# Patient Record
Sex: FEMALE | Race: WHITE | NOT HISPANIC OR LATINO | URBAN - METROPOLITAN AREA
[De-identification: names, ages, dates, MRNs, and addresses within clinical notes are randomized per-mention and may not be internally consistent; named-entity substitution may affect disease eponyms.]

---

## 2020-08-06 NOTE — PROGRESS NOTES
Problem List:  1. Tachycardia  a. Echocardiogram 4/26/18: EF 55-60%, mild MR, RVSP 34 mmHg  b. Holter Monitor 9/30/19: HR  bpm. NSR with average HR 88 bpm. No bradycardia or pauses. No ventricular ectopy. Episode of NSAT  2. HTN  3. HLP  4. DARRYL - CPAP   5. Hiatal Hernia  6. Anxiety  7. Surgical History:  a. Bilateral tubal ligation  b. Cholecystectomy  c. Lumbar spine surgery

## 2020-08-07 ENCOUNTER — CONSULT (OUTPATIENT)
Dept: CARDIOLOGY | Facility: CLINIC | Age: 45
End: 2020-08-07

## 2020-08-07 VITALS
HEIGHT: 63 IN | WEIGHT: 231 LBS | HEART RATE: 83 BPM | BODY MASS INDEX: 40.93 KG/M2 | DIASTOLIC BLOOD PRESSURE: 70 MMHG | SYSTOLIC BLOOD PRESSURE: 128 MMHG

## 2020-08-07 DIAGNOSIS — I47.1 ATRIAL TACHYCARDIA (HCC): Primary | ICD-10-CM

## 2020-08-07 PROCEDURE — 99244 OFF/OP CNSLTJ NEW/EST MOD 40: CPT | Performed by: INTERNAL MEDICINE

## 2020-08-07 RX ORDER — ONDANSETRON 4 MG/1
4 TABLET, FILM COATED ORAL EVERY 8 HOURS PRN
COMMUNITY
End: 2021-06-08

## 2020-08-07 RX ORDER — FLECAINIDE ACETATE 100 MG/1
100 TABLET ORAL 2 TIMES DAILY
Qty: 60 TABLET | Refills: 11 | Status: SHIPPED | OUTPATIENT
Start: 2020-08-07 | End: 2021-06-08

## 2020-08-07 RX ORDER — SUMATRIPTAN 100 MG/1
100 TABLET, FILM COATED ORAL AS NEEDED
COMMUNITY

## 2020-08-07 RX ORDER — ACYCLOVIR 800 MG/1
800 TABLET ORAL 3 TIMES DAILY
COMMUNITY
End: 2021-06-08

## 2020-08-07 RX ORDER — CITALOPRAM 10 MG/1
10 TABLET ORAL DAILY
COMMUNITY
End: 2021-06-08

## 2020-08-07 RX ORDER — METOPROLOL TARTRATE 50 MG/1
50 TABLET, FILM COATED ORAL 2 TIMES DAILY
Qty: 60 TABLET | Refills: 5
Start: 2020-08-07 | End: 2021-06-08

## 2020-08-07 RX ORDER — POTASSIUM CHLORIDE 750 MG/1
10 TABLET, EXTENDED RELEASE ORAL 2 TIMES DAILY
COMMUNITY
End: 2021-06-08

## 2020-08-07 RX ORDER — AMITRIPTYLINE HYDROCHLORIDE 25 MG/1
25 TABLET, FILM COATED ORAL NIGHTLY
COMMUNITY
End: 2021-06-08

## 2020-08-07 RX ORDER — METOPROLOL TARTRATE 100 MG/1
100 TABLET ORAL 2 TIMES DAILY
COMMUNITY
End: 2020-08-07 | Stop reason: SDUPTHER

## 2020-08-07 RX ORDER — FUROSEMIDE 40 MG/1
40 TABLET ORAL DAILY
COMMUNITY
End: 2021-06-08

## 2020-08-07 RX ORDER — ATORVASTATIN CALCIUM 40 MG/1
40 TABLET, FILM COATED ORAL DAILY
COMMUNITY
End: 2021-06-08

## 2020-08-07 RX ORDER — ESOMEPRAZOLE MAGNESIUM 40 MG/1
40 CAPSULE, DELAYED RELEASE ORAL
COMMUNITY

## 2020-08-07 RX ORDER — VENLAFAXINE 75 MG/1
75 TABLET ORAL DAILY
COMMUNITY
End: 2021-06-08

## 2020-08-07 RX ORDER — DICYCLOMINE HCL 20 MG
20 TABLET ORAL EVERY 6 HOURS
COMMUNITY
End: 2021-06-08

## 2020-08-07 RX ORDER — NAPROXEN 500 MG/1
500 TABLET ORAL 2 TIMES DAILY WITH MEALS
COMMUNITY
End: 2021-06-08

## 2020-08-07 RX ORDER — TRAZODONE HYDROCHLORIDE 50 MG/1
50 TABLET ORAL NIGHTLY PRN
COMMUNITY

## 2020-08-07 RX ORDER — DOXYCYCLINE HYCLATE 50 MG/1
50 CAPSULE ORAL DAILY
COMMUNITY
End: 2021-10-14

## 2020-08-07 NOTE — PROGRESS NOTES
[facility]  INITIAL OFFICE CONSULT      Eliza Capps  1975  PCP: Herman Iyer MD    SUBJECTIVE:   Eliza Capps is a 44 y.o. female seen for a consultation visit regarding the following:     Chief Complaint: SVT       Consultation is requested by May Oliveira MD for evaluation of SVT      History:  Pleasant 44-year-old female presents today at request of Dr. Oliveira regarding palpitations.  Patient reports she is had palpitations for over 2 years.  She states intermittently she will have random fast episode of tachypalpitations lasting a few seconds resolving on their own.  She also feels that her heart rate is elevated most the time.  She initially has been treated with metoprolol therapy last year she increased from 50 to 100 mg twice daily.  She continues to have breakthrough events.  She has not any chest pain suggesting angina pectoris.  She did have a routine stress test 2 years ago that revealed no evidence of ischemia.  She had recent follow-up monitor in January that revealed documentation of the symptoms that been bothering her.  This revealed episodes of atrial tachycardia.  In addition at that time she had echocardiogram revealing normal LV function.  She denies any dizziness, near syncope's events.  She does have some lower extreme edema which has been treated with Lasix therapy by Dr. Oliveira and has improved this.  She wears a CPAP on a regular basis.      Cardiac PMH: (Old records have been reviewed and summarized below)  Problem List:  1. Atrial Tachycardia  a. Echocardiogram 4/26/18: EF 55-60%, mild MR, RVSP 34 mmHg  b. Holter Monitor 9/30/19: HR  bpm. NSR with average HR 88 bpm. No bradycardia or pauses. No ventricular ectopy. Episode of NSAT  c. Negative MPS 2018  2. HTN  3. HLP  4. DARRYL - CPAP   5. Hiatal Hernia  6. Anxiety  7. Obesity, BMI 40.9  8. Dm type II  9. Migraine Headaches  10. Surgical History:  a. Bilateral tubal  ligation  b. Cholecystectomy  c. Lumbar spine surgery       Past Medical History, Past Surgical History, Family history, Social History, and Medications were all reviewed with the patient today and updated as necessary.     Current Outpatient Medications   Medication Sig Dispense Refill   • acyclovir (ZOVIRAX) 800 MG tablet Take 800 mg by mouth 3 (Three) Times a Day.     • amitriptyline (ELAVIL) 25 MG tablet Take 25 mg by mouth Every Night.     • atorvastatin (LIPITOR) 40 MG tablet Take 40 mg by mouth Daily.     • citalopram (CeleXA) 10 MG tablet Take 10 mg by mouth Daily.     • Cyanocobalamin (B-12 IJ) Inject  as directed Every 30 (Thirty) Days.     • dicyclomine (BENTYL) 20 MG tablet Take 20 mg by mouth Every 6 (Six) Hours.     • doxycycline (VIBRAMYCIN) 50 MG capsule Take 50 mg by mouth Daily.     • esomeprazole (nexIUM) 40 MG capsule Take 40 mg by mouth Every Morning Before Breakfast.     • furosemide (LASIX) 40 MG tablet Take 40 mg by mouth Daily.     • metFORMIN (GLUCOPHAGE) 500 MG tablet Take 500 mg by mouth 2 (Two) Times a Day With Meals.     • metoprolol tartrate (LOPRESSOR) 100 MG tablet Take 100 mg by mouth 2 (Two) Times a Day.     • naproxen (NAPROSYN) 500 MG tablet Take 500 mg by mouth 2 (Two) Times a Day With Meals.     • ondansetron (ZOFRAN) 4 MG tablet Take 4 mg by mouth Every 8 (Eight) Hours As Needed for Nausea or Vomiting.     • Plecanatide (Trulance) 3 MG tablet Take  by mouth Daily.     • potassium chloride (K-DUR,KLOR-CON) 10 MEQ CR tablet Take 10 mEq by mouth 2 (Two) Times a Day.     • SUMAtriptan (IMITREX) 100 MG tablet Take 100 mg by mouth As Needed for Migraine. Take one tablet at onset of headache. May repeat dose one time in 2 hours if headache not relieved.     • traZODone (DESYREL) 50 MG tablet Take 50 mg by mouth At Night As Needed for Sleep.     • venlafaxine (EFFEXOR) 75 MG tablet Take 75 mg by mouth Daily. 3 caps qd       No current facility-administered medications for this visit.   "    No Known Allergies      Past Medical History:   Diagnosis Date   • Clotting disorder (CMS/HCC)    • Diabetes mellitus (CMS/HCC)     pre-diabetic   • Hyperlipidemia    • Hypertension    • Sleep apnea      Past Surgical History:   Procedure Laterality Date   • BACK SURGERY     • CHOLECYSTECTOMY     • TUBAL ABDOMINAL LIGATION       History reviewed. No pertinent family history.  Social History     Tobacco Use   • Smoking status: Never Smoker   • Smokeless tobacco: Never Used   Substance Use Topics   • Alcohol use: Not Currently     Frequency: Never       ROS:  Review of Symptoms:  General: + fatigue  Skin: no rashes, lumps, or other skin changes  HEENT: no dizziness, lightheadedness, or vision changes  Respiratory: no cough or hemoptysis  Cardiovascular: + palpitations, and tachycardia  Gastrointestinal: no black/tarry stools or diarrhea  Urinary: no change in frequency or urgency  Peripheral Vascular: no claudication or leg cramps  Musculoskeletal: no muscle or joint pain/stiffness  Psychiatric: no depression or excessive stress  Neurological: no sensory or motor loss, no syncope  Hematologic: no anemia, easy bruising or bleeding  Endocrine: no thyroid problems, nor heat or cold intolerance         PHYSICAL EXAM:   /70 (BP Location: Left arm, Patient Position: Sitting)   Pulse 83   Ht 160 cm (63\")   Wt 105 kg (231 lb)   BMI 40.92 kg/m²      Wt Readings from Last 5 Encounters:   08/07/20 105 kg (231 lb)     BP Readings from Last 5 Encounters:   08/07/20 128/70       Physical Exam   Constitutional: oriented to person, place, and time.  well-developed and well-nourished. No distress.   HENT: Normocephalic.   Eyes: Conjunctivae are normal. No scleral icterus.   Neck: Normal carotid pulses, no hepatojugular reflux and no JVD present. Carotid bruit is not present. No tracheal deviation, no edema and no erythema present. No thyromegaly present.   Cardiovascular: Regular rate and rhythm normal S1 and S2 there is " an S4 no murmurs. PMI NL  Pulses: equal and symmetrical.   Pulmonary/Chest: Clear to Auscultation bilaterally with no rales or wheezes. Resp effort NL  Abdominal: Soft. Bowel sounds are normal. no distension and no mass. There is no hepatosplenomegaly. There is no tenderness. There is no rebound and no guarding. No aortic pulsations.  Musculoskeletal:  exhibits no edema, tenderness or deformity.   Neurological: is alert and oriented to person, place, and time.  Rest is nonfocal.    Skin: Skin is warm and dry. No rash noted. No diaphoretic. No cyanosis or erythema. No pallor. Nails show no clubbing.   Psychiatric: Normal mood and affect.Speech is normal and behavior is normal.      Medical problems and test results were reviewed with the patient today.     EKG:  (EKG/Tracing has been independently visualized by me and summarized below)    Procedures    ASSESSMENT   1. SVT: AT on Zio monitor  2. Normal Stress MPS 2018, Normal Echocardiogram, Normal EF.   3. Obesity: Needs diet exercise, weight loss. BMI 40.9  4. DARRYL-CPAP  5. Dm Type II      PLAN  · Tambocor 100mg BID, EKG 72 hours later with results to our office  · Reduce lopressor to 50mg BID secandary to fatigue on higher dose medication.  · Follow up in [facility].     Cardiology/Electrophysiology  08/07/20  11:15  Will Yoandy CARBALLO Scribe for Dr. Hong ALEXIS, Mango Pitts MD, personally performed the services described in this documentation as scribed by the above named individual in my presence, and it is both accurate and complete.  8/12/2020  17:51

## 2021-06-08 ENCOUNTER — OFFICE VISIT (OUTPATIENT)
Dept: ORTHOPEDIC SURGERY | Facility: CLINIC | Age: 46
End: 2021-06-08

## 2021-06-08 VITALS
WEIGHT: 197.4 LBS | BODY MASS INDEX: 34.98 KG/M2 | HEIGHT: 63 IN | HEART RATE: 56 BPM | DIASTOLIC BLOOD PRESSURE: 92 MMHG | SYSTOLIC BLOOD PRESSURE: 151 MMHG

## 2021-06-08 DIAGNOSIS — M25.551 RIGHT HIP PAIN: ICD-10-CM

## 2021-06-08 DIAGNOSIS — M70.61 TROCHANTERIC BURSITIS OF RIGHT HIP: Primary | ICD-10-CM

## 2021-06-08 PROCEDURE — 20610 DRAIN/INJ JOINT/BURSA W/O US: CPT | Performed by: ORTHOPAEDIC SURGERY

## 2021-06-08 PROCEDURE — 99204 OFFICE O/P NEW MOD 45 MIN: CPT | Performed by: ORTHOPAEDIC SURGERY

## 2021-06-08 RX ORDER — LIDOCAINE HYDROCHLORIDE 10 MG/ML
3 INJECTION, SOLUTION EPIDURAL; INFILTRATION; INTRACAUDAL; PERINEURAL
Status: COMPLETED | OUTPATIENT
Start: 2021-06-08 | End: 2021-06-08

## 2021-06-08 RX ORDER — BUPIVACAINE HYDROCHLORIDE 2.5 MG/ML
3 INJECTION, SOLUTION EPIDURAL; INFILTRATION; INTRACAUDAL
Status: COMPLETED | OUTPATIENT
Start: 2021-06-08 | End: 2021-06-08

## 2021-06-08 RX ORDER — ATENOLOL 50 MG/1
50 TABLET ORAL 2 TIMES DAILY
COMMUNITY

## 2021-06-08 RX ORDER — LUBIPROSTONE 24 UG/1
24 CAPSULE ORAL 2 TIMES DAILY WITH MEALS
COMMUNITY
End: 2021-12-18

## 2021-06-08 RX ORDER — TRIAMCINOLONE ACETONIDE 40 MG/ML
80 INJECTION, SUSPENSION INTRA-ARTICULAR; INTRAMUSCULAR
Status: COMPLETED | OUTPATIENT
Start: 2021-06-08 | End: 2021-06-08

## 2021-06-08 RX ADMIN — LIDOCAINE HYDROCHLORIDE 3 ML: 10 INJECTION, SOLUTION EPIDURAL; INFILTRATION; INTRACAUDAL; PERINEURAL at 10:38

## 2021-06-08 RX ADMIN — TRIAMCINOLONE ACETONIDE 80 MG: 40 INJECTION, SUSPENSION INTRA-ARTICULAR; INTRAMUSCULAR at 10:38

## 2021-06-08 RX ADMIN — BUPIVACAINE HYDROCHLORIDE 3 ML: 2.5 INJECTION, SOLUTION EPIDURAL; INFILTRATION; INTRACAUDAL at 10:38

## 2021-06-08 NOTE — PROGRESS NOTES
Orthopaedic Clinic Note: Hip New Patient    Chief Complaint   Patient presents with   • Right Hip - Pain        HPI    Eliza Capps is a 45 y.o. female who presents with right hip pain for 4 week(s). Onset mechanical fall. Pain is localized to lateral trochanter and is a 10/10 on the pain scale.Pain is described as aching, burning, throbbing, stabbing and shooting. Associated symptoms include pain, grinding, stiffness and giving way/buckling.  The pain is worse with walking, standing, sitting, climbing stairs, sleeping, working, leisure, lying of affected side, rising from a seated position, any movement of joint; lying down and elevating the extremity improve the pain. Previous treatments have included: weight loss since symptom onset. Although some transient relief was reported with these interventions, these conservative measures have failed and symptoms have persisted. The patient is limited in daily activities and has had a significant decrease in quality of life as a result. She denies fevers, chills, or constitutional symptoms.    I have reviewed the following portions of the patient's history:History of Present Illness    Past Medical History:   Diagnosis Date   • Clotting disorder (CMS/HCC)    • Diabetes mellitus (CMS/[facility])     pre-diabetic   • Hyperlipidemia    • Hypertension    • Sleep apnea       Past Surgical History:   Procedure Laterality Date   • BACK SURGERY     • CHOLECYSTECTOMY     • GASTRIC SLEEVE LAPAROSCOPIC  04/2021   • TUBAL ABDOMINAL LIGATION        History reviewed. No pertinent family history.  Social History     Socioeconomic History   • Marital status:      Spouse name: Not on file   • Number of children: Not on file   • Years of education: Not on file   • Highest education level: Not on file   Tobacco Use   • Smoking status: Never Smoker   • Smokeless tobacco: Never Used   Substance and Sexual Activity   • Alcohol use: Not Currently   • Drug use: Never   • Sexual activity:  Defer      Current Outpatient Medications on File Prior to Visit   Medication Sig Dispense Refill   • atenolol (TENORMIN) 50 MG tablet Take 50 mg by mouth 2 (Two) Times a Day.     • Cyanocobalamin (B-12 IJ) Inject  as directed Every 30 (Thirty) Days.     • doxycycline (VIBRAMYCIN) 50 MG capsule Take 50 mg by mouth Daily.     • esomeprazole (nexIUM) 40 MG capsule Take 40 mg by mouth Every Morning Before Breakfast.     • HYDROCODONE-ACETAMINOPHEN PO Take 7.5-325 mg/mL by mouth. 15 mL TID     • lubiprostone (AMITIZA) 24 MCG capsule Take 24 mcg by mouth 2 (Two) Times a Day With Meals.     • sertraline (ZOLOFT) 50 MG tablet Take 50 mg by mouth Daily.     • SUMAtriptan (IMITREX) 100 MG tablet Take 100 mg by mouth As Needed for Migraine. Take one tablet at onset of headache. May repeat dose one time in 2 hours if headache not relieved.     • traZODone (DESYREL) 50 MG tablet Take 50 mg by mouth At Night As Needed for Sleep.     • [DISCONTINUED] acyclovir (ZOVIRAX) 800 MG tablet Take 800 mg by mouth 3 (Three) Times a Day.     • [DISCONTINUED] amitriptyline (ELAVIL) 25 MG tablet Take 25 mg by mouth Every Night.     • [DISCONTINUED] atorvastatin (LIPITOR) 40 MG tablet Take 40 mg by mouth Daily.     • [DISCONTINUED] citalopram (CeleXA) 10 MG tablet Take 10 mg by mouth Daily.     • [DISCONTINUED] dicyclomine (BENTYL) 20 MG tablet Take 20 mg by mouth Every 6 (Six) Hours.     • [DISCONTINUED] flecainide (TAMBOCOR) 100 MG tablet Take 1 tablet by mouth 2 (Two) Times a Day. 60 tablet 11   • [DISCONTINUED] furosemide (LASIX) 40 MG tablet Take 40 mg by mouth Daily.     • [DISCONTINUED] metFORMIN (GLUCOPHAGE) 500 MG tablet Take 500 mg by mouth 2 (Two) Times a Day With Meals.     • [DISCONTINUED] metoprolol tartrate (LOPRESSOR) 50 MG tablet Take 1 tablet by mouth 2 (Two) Times a Day. 60 tablet 5   • [DISCONTINUED] naproxen (NAPROSYN) 500 MG tablet Take 500 mg by mouth 2 (Two) Times a Day With Meals.     • [DISCONTINUED] ondansetron  "(ZOFRAN) 4 MG tablet Take 4 mg by mouth Every 8 (Eight) Hours As Needed for Nausea or Vomiting.     • [DISCONTINUED] Plecanatide (Trulance) 3 MG tablet Take  by mouth Daily.     • [DISCONTINUED] potassium chloride (K-DUR,KLOR-CON) 10 MEQ CR tablet Take 10 mEq by mouth 2 (Two) Times a Day.     • [DISCONTINUED] venlafaxine (EFFEXOR) 75 MG tablet Take 75 mg by mouth Daily. 3 caps qd       No current facility-administered medications on file prior to visit.      No Known Allergies     Review of Systems   Constitutional: Negative.    HENT: Negative.    Eyes: Negative.    Respiratory: Negative.    Cardiovascular: Negative.    Gastrointestinal: Negative.    Endocrine: Negative.    Genitourinary: Negative.    Musculoskeletal: Positive for arthralgias.   Skin: Negative.    Allergic/Immunologic: Negative.    Neurological: Negative.    Hematological: Negative.    Psychiatric/Behavioral: Negative.         The patient's Review of Systems was personally reviewed and confirmed as accurate.    The following portions of the patient's history were reviewed and updated as appropriate: allergies, current medications, past family history, past medical history, past social history, past surgical history and problem list.    Physical Exam  Blood pressure 151/92, pulse 56, height 160 cm (62.99\"), weight 89.5 kg (197 lb 6.4 oz).    Body mass index is 34.98 kg/m².    GENERAL APPEARANCE: awake, alert & oriented x 3, in no acute distress and well developed, well nourished  PSYCH: normal affect  LUNGS:  breathing nonlabored  EYES: sclera anicteric  CARDIOVASCULAR: palpable dorsalis pedis, palpable posterior tibial bilaterally. Capillary refill less than 2 seconds  EXTREMITIES: no clubbing, cyanosis  GAIT:  Antalgic           Right Hip Exam:  RANGE OF MOTION:   FLEXION CONTRACTURE: None   FLEXION: 110 degrees   INTERNAL ROTATION: 20 degrees at 90 degrees of flexion   EXTERNAL ROTATION: 40 degrees at 90 degrees of flexion    PAIN WITH HIP MOTION: " Yes, lateral trochanter  PAIN WITH LOGROLL: no  STINCHFIELD TEST: negative    KNEE EXAM: full knee ROM (0-120 degrees), stable to varus and valgus stress at terminal extension and 30 degrees flexion     STRENGTH:  5/5 hip adduction, abduction, flexion. 5/5 strength knee flexion, extension. 5/5 strength ankle dorsiflexion and plantarflexion.     GREATER TROCHANTER BURSAL PAIN: Yes     REFLEXES:   PATELLAR 2+/4   ACHILLES 2+/4    CLONUS: negative  STRAIGHT LEG TEST:   negative    SENSATION TO LIGHT TOUCH:  DEEP PERONEAL/SUPERFICIAL PERONEAL/SURAL/SAPHENOUS/TIBIAL:  intact    EDEMA:   no  ERYTHEMA:  no  WOUNDS/INCISIONS: no overlying skin problems.      Left Hip Exam:   RANGE OF MOTION:   FLEXION CONTRACTURE: None   FLEXION: 110 degrees   INTERNAL ROTATION: 20 degrees at 90 degrees of flexion   EXTERNAL ROTATION: 40 degrees at 90 degrees of flexion    PAIN WITH HIP MOTION: no  PAIN WITH LOGROLL: no  STINCHFIELD TEST: negative    KNEE EXAM: full knee ROM (0-120 degrees), stable to varus and valgus stress at terminal extension and 30 degrees flexion     STRENGTH:  5/5 hip adduction, abduction, flexion. 5/5 strength knee flexion, extension. 5/5 strength ankle dorsiflexion and plantarflexion.     GREATER TROCHANTER BURSAL PAIN:  no     REFLEXES:   PATELLAR 2+/4   ACHILLES 2+/4    CLONUS: negative  STRAIGHT LEG TEST:   negative    SENSATION TO LIGHT TOUCH:  DEEP PERONEAL/SUPERFICIAL PERONEAL/SURAL/SAPHENOUS/TIBIAL:  intact    EDEMA:   no  ERYTHEMA:  no  WOUNDS/INCISIONS: no overlying skin problems.      ------------------------------------------------------------------    LEG LENGTHS:  equal  _____________________________________________________  _____________________________________________________    RADIOGRAPHIC FINDINGS:   Indication: Right hip pain    Comparison: No prior xrays are available for comparison    AP pelvis, hip 2 views: Right: mild joint space narrowing, minimal osteophyte formation; Left: mild joint space  narrowing, minimal osteophyte formation    Assessment/Plan:   Diagnosis Plan   1. Trochanteric bursitis of right hip  Ambulatory Referral to Physical Therapy Evaluate and treat    Large Joint Arthrocentesis: R greater trochanteric bursa   2. Right hip pain  XR Hip With or Without Pelvis 2 - 3 View Right     Patient's hip pain is due to trochanteric bursitis.  She also has pain posteriorly in the SI joint region but this is minor.  I discussed that her hip range of motion is well-preserved and minimally symptomatic today.  I discussed treatment options with her.  She is unable take anti-inflammatory secondary to recent gastric bypass.  I will prescribe her physical therapy as well as proceed to trochanteric bursa cortisone injection today.  She'll follow-up in 2 months for repeat assessment.  If her symptoms fail to adequately improve, MRI may be warranted.    Procedure Note:  I discussed with the patient the potential benefits of performing a therapeutic injection of the right hip trochanteric bursa as well as potential risks including but not limited to infection, swelling, pain, bleeding, bruising, nerve/vessel damage, skin color changes, transient elevation in blood glucose levels, and fat atrophy. After informed consent and verifying correct patient, procedure site, and type of procedure, the area was prepped with alcohol, ethyl chloride was used to numb the skin. Via the direct lateral approach, 3cc of 1% lidocaine, 3cc of 0.25% bupivicaine and 2 cc of 40mg/ml of Kenalog were injected into the right hip trochanteric bursa. The patient tolerated the procedure well. There were no complications. A sterile dressing was placed over the injection site.      Clayton Freed MD  06/08/21  10:39 EDT

## 2021-06-08 NOTE — PROGRESS NOTES
Procedure   Large Joint Arthrocentesis: R greater trochanteric bursa  Date/Time: 6/8/2021 10:38 AM  Consent given by: patient  Site marked: site marked  Timeout: Immediately prior to procedure a time out was called to verify the correct patient, procedure, equipment, support staff and site/side marked as required   Supporting Documentation  Indications: pain   Procedure Details  Location: hip - R greater trochanteric bursa  Preparation: Patient was prepped and draped in the usual sterile fashion  Needle size: 23 G  Approach: lateral  Medications administered: 3 mL bupivacaine (PF) 0.25 %; 3 mL lidocaine PF 1% 1 %; 80 mg triamcinolone acetonide 40 MG/ML  Patient tolerance: patient tolerated the procedure well with no immediate complications

## 2021-08-12 ENCOUNTER — TELEPHONE (OUTPATIENT)
Dept: ORTHOPEDIC SURGERY | Facility: CLINIC | Age: 46
End: 2021-08-12

## 2021-10-14 ENCOUNTER — OFFICE VISIT (OUTPATIENT)
Dept: ENDOCRINOLOGY | Facility: CLINIC | Age: 46
End: 2021-10-14

## 2021-10-14 ENCOUNTER — LAB (OUTPATIENT)
Dept: LAB | Facility: HOSPITAL | Age: 46
End: 2021-10-14

## 2021-10-14 VITALS
DIASTOLIC BLOOD PRESSURE: 82 MMHG | WEIGHT: 186.6 LBS | BODY MASS INDEX: 33.06 KG/M2 | SYSTOLIC BLOOD PRESSURE: 124 MMHG | HEIGHT: 63 IN

## 2021-10-14 DIAGNOSIS — R23.2 PLETHORA: ICD-10-CM

## 2021-10-14 DIAGNOSIS — H05.20 PROPTOSIS: ICD-10-CM

## 2021-10-14 DIAGNOSIS — E11.9 TYPE 2 DIABETES MELLITUS WITHOUT COMPLICATION, WITHOUT LONG-TERM CURRENT USE OF INSULIN (HCC): ICD-10-CM

## 2021-10-14 DIAGNOSIS — R79.89 ABNORMAL THYROID BLOOD TEST: Primary | ICD-10-CM

## 2021-10-14 PROBLEM — Z86.79: Status: ACTIVE | Noted: 2021-10-14

## 2021-10-14 PROBLEM — M54.50 CHRONIC RIGHT-SIDED LOW BACK PAIN: Status: ACTIVE | Noted: 2021-09-13

## 2021-10-14 PROBLEM — G89.29 CHRONIC RIGHT-SIDED LOW BACK PAIN: Status: ACTIVE | Noted: 2021-09-13

## 2021-10-14 PROBLEM — G47.33 OSA (OBSTRUCTIVE SLEEP APNEA): Status: ACTIVE | Noted: 2021-10-14

## 2021-10-14 PROBLEM — I10 HIGH BLOOD PRESSURE: Status: ACTIVE | Noted: 2021-10-14

## 2021-10-14 PROBLEM — R73.03 PREDIABETES: Status: ACTIVE | Noted: 2021-10-14

## 2021-10-14 PROBLEM — K21.9 GASTROESOPHAGEAL REFLUX DISEASE: Status: ACTIVE | Noted: 2021-10-14

## 2021-10-14 LAB
ALBUMIN SERPL-MCNC: 4 G/DL (ref 3.5–5.2)
ALBUMIN/GLOB SERPL: 1.7 G/DL
ALP SERPL-CCNC: 289 U/L (ref 39–117)
ALT SERPL W P-5'-P-CCNC: 19 U/L (ref 1–33)
ANION GAP SERPL CALCULATED.3IONS-SCNC: 10.3 MMOL/L (ref 5–15)
AST SERPL-CCNC: 16 U/L (ref 1–32)
BILIRUB SERPL-MCNC: <0.2 MG/DL (ref 0–1.2)
BUN SERPL-MCNC: 10 MG/DL (ref 6–20)
BUN/CREAT SERPL: 10.8 (ref 7–25)
CALCIUM SPEC-SCNC: 8.9 MG/DL (ref 8.6–10.5)
CHLORIDE SERPL-SCNC: 104 MMOL/L (ref 98–107)
CO2 SERPL-SCNC: 25.7 MMOL/L (ref 22–29)
CORTIS AM PEAK SERPL-MCNC: 14.02 MCG/DL
CREAT SERPL-MCNC: 0.93 MG/DL (ref 0.57–1)
GFR SERPL CREATININE-BSD FRML MDRD: 65 ML/MIN/1.73
GLOBULIN UR ELPH-MCNC: 2.3 GM/DL
GLUCOSE SERPL-MCNC: 137 MG/DL (ref 65–99)
HBA1C MFR BLD: 5.9 % (ref 4.8–5.6)
POTASSIUM SERPL-SCNC: 4.1 MMOL/L (ref 3.5–5.2)
PROT SERPL-MCNC: 6.3 G/DL (ref 6–8.5)
SODIUM SERPL-SCNC: 140 MMOL/L (ref 136–145)
T4 FREE SERPL-MCNC: 0.75 NG/DL (ref 0.93–1.7)
TSH SERPL DL<=0.05 MIU/L-ACNC: 1 UIU/ML (ref 0.27–4.2)

## 2021-10-14 PROCEDURE — 84443 ASSAY THYROID STIM HORMONE: CPT | Performed by: INTERNAL MEDICINE

## 2021-10-14 PROCEDURE — 84439 ASSAY OF FREE THYROXINE: CPT | Performed by: INTERNAL MEDICINE

## 2021-10-14 PROCEDURE — 80053 COMPREHEN METABOLIC PANEL: CPT | Performed by: INTERNAL MEDICINE

## 2021-10-14 PROCEDURE — 83520 IMMUNOASSAY QUANT NOS NONAB: CPT | Performed by: INTERNAL MEDICINE

## 2021-10-14 PROCEDURE — 99204 OFFICE O/P NEW MOD 45 MIN: CPT | Performed by: INTERNAL MEDICINE

## 2021-10-14 PROCEDURE — 83036 HEMOGLOBIN GLYCOSYLATED A1C: CPT | Performed by: INTERNAL MEDICINE

## 2021-10-14 PROCEDURE — 82533 TOTAL CORTISOL: CPT

## 2021-10-14 RX ORDER — CYCLOBENZAPRINE HCL 5 MG
5 TABLET ORAL 3 TIMES DAILY
COMMUNITY
Start: 2021-10-07 | End: 2021-10-17

## 2021-10-14 RX ORDER — PSEUDOEPHEDRINE HCL 30 MG
250 TABLET ORAL DAILY
COMMUNITY
Start: 2021-10-07 | End: 2021-10-17

## 2021-10-14 RX ORDER — GABAPENTIN 800 MG/1
800 TABLET ORAL 3 TIMES DAILY
COMMUNITY

## 2021-10-14 RX ORDER — DEXAMETHASONE 1 MG
1 TABLET ORAL ONCE
Qty: 1 TABLET | Refills: 0 | Status: SHIPPED | OUTPATIENT
Start: 2021-10-14 | End: 2021-10-14

## 2021-10-14 RX ORDER — LISINOPRIL 10 MG/1
10 TABLET ORAL 2 TIMES DAILY
COMMUNITY

## 2021-10-14 NOTE — PROGRESS NOTES
Chief Complaint   Patient presents with   • Abnormal Thyroid Function        Referring Provider  Herman Iyer MD     HPI   Eliza Capps is a 45 y.o. female had concerns including Abnormal Thyroid Function.     Patient had labs checked in June with a suppressed TSH, low T4, TPO and TSI were normal.  June/July went for annual eye exam and was noted to have dry eyes.   Also was noted to have rosacea. She went to see Derm who put her on doxy for 3 months but had no improvement.   This prompted workup for Graves disease due to noted bulging of eyes.   Pt notes eye watering, was told from dry eyes. Is using heat pack and drops.     Pt saw Dr. Amandeep Mcmillan in the past and had stents placed in her eyes but reports no improvement.   She saw him recently for follow-up for possible Graves eye disease. Will follow-up with him after this visit.     Several months ago she did note feeling fast heart rate. At times feels this at night, has palpitations. Now has night sweats.   Also developed easy bruising on her arms - has bruising on her arms from a BP cuff. Started end of August/September, after going off doxycycline.   Has chronic heat intolerance. Cold feet. Has venous insufficiency.     Recently also has hair thinning over the last 3-4 months.     Had bariatric sleeve the end of April.   Since April has lost 64 lbs total. In the last month the weight loss has stabilized. Her activity level is decreased from a back injury.     Has a new dark and wide stretch erica on her left side.     No recurrent steroids, only one pred pack for back pain in recent months.     No COVID infections. Is vaccinated.     DM was diagnosed in March. A1c last 6.5. Had prediabetes for years. Recalls an A1c up to 7.2.   Was on metformin in the past.       Past Medical History:   Diagnosis Date   • Clotting disorder (HCC)    • Diabetes mellitus (HCC)    • Hyperlipidemia    • Hypertension    • Sleep apnea      Past Surgical History:   Procedure  Laterality Date   • BACK SURGERY     • CHOLECYSTECTOMY     • GASTRIC SLEEVE LAPAROSCOPIC  04/2021   • TUBAL ABDOMINAL LIGATION        History reviewed. No pertinent family history.   Social History     Socioeconomic History   • Marital status:    Tobacco Use   • Smoking status: Never Smoker   • Smokeless tobacco: Never Used   Vaping Use   • Vaping Use: Never used   Substance and Sexual Activity   • Alcohol use: Not Currently   • Drug use: Never   • Sexual activity: Defer      No Known Allergies   Current Outpatient Medications on File Prior to Visit   Medication Sig Dispense Refill   • atenolol (TENORMIN) 50 MG tablet Take 50 mg by mouth 2 (Two) Times a Day.     • Cholecalciferol 50 MCG (2000 UT) capsule Take 2,000 Units by mouth Daily.     • Cyanocobalamin (B-12 IJ) Inject  as directed Every 30 (Thirty) Days.     • cyclobenzaprine (FLEXERIL) 5 MG tablet Take 5 mg by mouth 3 (Three) Times a Day.     • docusate sodium 250 MG capsule Take 250 mg by mouth Daily.     • esomeprazole (nexIUM) 40 MG capsule Take 40 mg by mouth Every Morning Before Breakfast.     • gabapentin (NEURONTIN) 300 MG capsule Take  by mouth 3 (Three) Times a Day.     • HYDROCODONE-ACETAMINOPHEN PO Take 7.5-325 mg/mL by mouth. 15 mL TID     • lisinopril (PRINIVIL,ZESTRIL) 20 MG tablet Take 20 mg by mouth.     • lubiprostone (AMITIZA) 24 MCG capsule Take 24 mcg by mouth 2 (Two) Times a Day With Meals.     • sertraline (ZOLOFT) 50 MG tablet Take 200 mg by mouth Daily.     • SUMAtriptan (IMITREX) 100 MG tablet Take 100 mg by mouth As Needed for Migraine. Take one tablet at onset of headache. May repeat dose one time in 2 hours if headache not relieved.     • traZODone (DESYREL) 50 MG tablet Take 50 mg by mouth At Night As Needed for Sleep.     • [DISCONTINUED] doxycycline (VIBRAMYCIN) 50 MG capsule Take 50 mg by mouth Daily.       No current facility-administered medications on file prior to visit.        Review of Systems   Constitutional:  "Positive for diaphoresis and fatigue.   HENT: Negative.    Eyes: Positive for discharge, redness and itching.   Respiratory: Negative.    Cardiovascular: Positive for palpitations.   Gastrointestinal: Positive for constipation.   Endocrine: Positive for heat intolerance.        Hair loss   Genitourinary: Positive for urgency and urinary incontinence.   Musculoskeletal: Positive for arthralgias, back pain, gait problem and myalgias.   Skin: Positive for color change and rash.   Allergic/Immunologic: Negative.    Neurological: Positive for weakness and numbness.   Hematological: Bruises/bleeds easily.   Psychiatric/Behavioral: Positive for agitation and decreased concentration. The patient is nervous/anxious.         /82 (BP Location: Left arm, Patient Position: Sitting, Cuff Size: Adult)   Ht 160 cm (62.99\")   Wt 84.6 kg (186 lb 9.6 oz)   BMI 33.07 kg/m²      Physical Exam    Constitutional:  well developed; well nourished  no acute distress  obese - Body mass index is 33.07 kg/m².   ENT/Thyroid: no thyromegaly  no palpable nodules   Eyes: EOM intact  Conjunctiva: clear   Respiratory:  breathing is unlabored  clear to auscultation bilaterally   Cardiovascular:  regular rate and rhythm, S1, S2 normal, no murmur, click, rub or gallop   Chest:  Not performed.   Abdomen: Not performed.   : Not performed.   Musculoskeletal: negative findings:  ROM of all joints is normal, no deformities present   Skin: dry and warm, dark and wide striae on left side of abdomen, facial plethora   Neuro: normal without focal findings and mental status, speech normal, alert and oriented x3   Psych: oriented to time, place and person, mood and affect are within normal limits     Labs/Imaging    6/3/21 TSH 0.23, free T4 0.71, TPO antibody normal, total T3 57 with a range of , thyroid-stimulating immunoglobulin less than 0.1    6/9/2021 A1c 6.5    Assessment and Plan    Diagnoses and all orders for this visit:    1. Abnormal " thyroid blood test (Primary)  TFTs from June with a low TSH, low free T4.  Suspect possible thyroiditis and labs were collected during early recovery during which she can transiently become hypothyroid.  Recheck TFTs today.  TPO antibody and TSI were normal.  Clinically the patient is euthyroid.  -     T4, Free  -     TSH  -     Thyrotropin Receptor Antibody    2. Proptosis  Suspect possible Graves' eye disease.  TSI was normal.  Will check TSH receptor antibody which is positive in up to 80 to 95% of Graves' disease patients.  She is already following with Dr. Amandeep Mcmillan.  Pending labs, patient will need to follow-up with Dr. Barnett to consider Tepezza therapy.    3. Plethora  Due to facial plethora, new striae, easy bruising, patient needs to be evaluated for possible Cushing's disease.  Will check dexamethasone suppression test.  -     Cortisol - AM; Future  -     dexamethasone (DECADRON) 1 MG tablet; Take 1 tablet by mouth 1 (One) Time for 1 dose. At 11 pm the night before the dexamethasone suppression test  Dispense: 1 tablet; Refill: 0    4. Type 2 diabetes mellitus without complication, without long-term current use of insulin ([facility])  Most recent A1c 6.5.  Patient has lost a significant amount weight since that time after her gastric sleeve.  Recheck A1c.  History of metformin therapy though patient is not on any diabetic medications at this time.  Ophtho exam up-to-date within the last several months.  -     Hemoglobin A1c  -     Comprehensive Metabolic Panel         Return in 4 months (on 2/14/2022) for next scheduled follow up. The patient was instructed to contact the clinic with any interval questions or concerns.    Milagro Leroy, DO   Endocrinologist    Please note that portions of this document were completed with a voice recognition program. Efforts were made to edit the dictations, but occasionally words are mis-transcribed.

## 2021-10-16 LAB — TSH RECEP AB SER-ACNC: <1.1 IU/L (ref 0–1.75)

## 2021-10-29 ENCOUNTER — TELEPHONE (OUTPATIENT)
Dept: ENDOCRINOLOGY | Facility: CLINIC | Age: 46
End: 2021-10-29

## 2021-10-29 NOTE — TELEPHONE ENCOUNTER
Spoke with patient.  Reviewed result note from Dr. Leroy.  Dexamethasone suppression test result requested from [facility].

## 2021-10-29 NOTE — TELEPHONE ENCOUNTER
Lab results placed in Dr. Bailey box.  Patient aware that Dr. Leroy is out of the office until Monday.

## 2021-11-01 DIAGNOSIS — R79.89 ELEVATED CORTISOL LEVEL: ICD-10-CM

## 2021-11-01 DIAGNOSIS — R23.2 PLETHORA: Primary | ICD-10-CM

## 2021-11-01 NOTE — TELEPHONE ENCOUNTER
Please confirm that the patient took the dexamethasone the night prior to the cortisol level?    If so, her cortisol did not suppress as it should have making me suspicious for Cushing's disease.  To confirm this, I would like to complete a 24-hour urine cortisol collection.  We can mail her the lab order and this can be completed through [facility].  They will need to give her a urine jug to ensure proper collection. I would also like her to have blood work done when she drops the urine off - preferably AM labs, fasting, close to 8 AM .

## 2021-11-01 NOTE — TELEPHONE ENCOUNTER
FYI- Patient states that she took the dexamethasone between 11pm and 12am.  Lab orders have been mailed.

## 2021-12-07 ENCOUNTER — TELEPHONE (OUTPATIENT)
Dept: ENDOCRINOLOGY | Facility: CLINIC | Age: 46
End: 2021-12-07

## 2021-12-08 DIAGNOSIS — R79.89 ELEVATED CORTISOL LEVEL: Primary | ICD-10-CM

## 2021-12-08 NOTE — TELEPHONE ENCOUNTER
Labs received.    11/22/2021 24-hour urine cortisol level was mildly elevated at 63 with an upper limit of 42.  24-hour urine creatinine 1495, total volume 1100.  A.m. cortisol 10:22 AM was 9.6, ACTH 67 with an upper limit of 63.3.    Labs are suggestive of hypercortisolism but not significantly elevated.  Repeat 24-hour urine cortisol.  Check late-night salivary cortisol.  Patient was notified and expressed understanding.  Will  the salivary cortisol test from our office and will  the urine collection jug from [facility].

## 2021-12-18 ENCOUNTER — OFFICE VISIT (OUTPATIENT)
Dept: NEUROSURGERY | Facility: CLINIC | Age: 46
End: 2021-12-18

## 2021-12-18 VITALS — HEIGHT: 63 IN | TEMPERATURE: 97.5 F | BODY MASS INDEX: 33.13 KG/M2 | WEIGHT: 187 LBS

## 2021-12-18 DIAGNOSIS — M48.062 SPINAL STENOSIS OF LUMBAR REGION WITH NEUROGENIC CLAUDICATION: ICD-10-CM

## 2021-12-18 DIAGNOSIS — M54.9 MECHANICAL BACK PAIN: Primary | ICD-10-CM

## 2021-12-18 DIAGNOSIS — M25.551 RIGHT HIP PAIN: ICD-10-CM

## 2021-12-18 PROCEDURE — 99203 OFFICE O/P NEW LOW 30 MIN: CPT | Performed by: NEUROLOGICAL SURGERY

## 2021-12-18 RX ORDER — BISACODYL 5 MG/1
5 TABLET, DELAYED RELEASE ORAL DAILY PRN
COMMUNITY

## 2021-12-18 RX ORDER — DULOXETIN HYDROCHLORIDE 60 MG/1
60 CAPSULE, DELAYED RELEASE ORAL DAILY
COMMUNITY

## 2021-12-18 NOTE — PROGRESS NOTES
Patient: Eliza Capps  : 1975    Primary Care Provider: Herman Iyer MD    Requesting Provider: As above        History    Chief Complaint: Low back and bilateral lower extremity pain, right greater than left.    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  She has had some chronic back difficulties.  Remotely she underwent lumbar discectomy by Dr. Thacker.  In April of this year she underwent gastric sleeve surgery and has lost a good deal of weight.  She has had pain in her back that extends into both legs more so on the right.  The pain involves the right anterior thigh but also extends down the back of her right calf to the foot.  She has a known peripheral neuropathy.  On 10/7/2021 she underwent L2-3 laminectomy at the [facility].  Unfortunately that has not helped with her pain.  She continues to have a lot of pain with sitting and particularly driving.  She is worse with daily activities as well.  She has had no fevers or chills.  Apparently she has had studies done on her hip that show some findings in the right hip itself as well.    Review of Systems   Constitutional: Positive for activity change and fatigue. Negative for appetite change, chills, diaphoresis, fever and unexpected weight change.   HENT: Negative for congestion, dental problem, drooling, ear discharge, ear pain, facial swelling, hearing loss, mouth sores, nosebleeds, postnasal drip, rhinorrhea, sinus pressure, sinus pain, sneezing, sore throat, tinnitus, trouble swallowing and voice change.    Eyes: Negative for photophobia, pain, discharge, redness, itching and visual disturbance.   Respiratory: Positive for apnea. Negative for cough, choking, chest tightness, shortness of breath, wheezing and stridor.    Cardiovascular: Positive for palpitations and leg swelling. Negative for chest pain.   Gastrointestinal: Negative for abdominal distention, abdominal  "pain, anal bleeding, blood in stool, constipation, diarrhea, nausea, rectal pain and vomiting.   Endocrine: Negative for cold intolerance, heat intolerance, polydipsia, polyphagia and polyuria.   Genitourinary: Negative for decreased urine volume, difficulty urinating, dyspareunia, dysuria, enuresis, flank pain, frequency, genital sores, hematuria, menstrual problem, pelvic pain, urgency, vaginal bleeding, vaginal discharge and vaginal pain.   Musculoskeletal: Positive for arthralgias and back pain. Negative for gait problem, joint swelling, myalgias, neck pain and neck stiffness.   Skin: Positive for rash. Negative for color change, pallor and wound.   Allergic/Immunologic: Negative for environmental allergies, food allergies and immunocompromised state.   Neurological: Positive for weakness. Negative for dizziness, tremors, seizures, syncope, facial asymmetry, speech difficulty, light-headedness, numbness and headaches.   Hematological: Negative for adenopathy. Bruises/bleeds easily.   Psychiatric/Behavioral: Positive for decreased concentration. Negative for agitation, behavioral problems, confusion, dysphoric mood, hallucinations, self-injury, sleep disturbance and suicidal ideas. The patient is nervous/anxious. The patient is not hyperactive.        The patient's past medical history, past surgical history, family history, and social history have been reviewed at length in the electronic medical record.    Physical Exam:   Temp 97.5 °F (36.4 °C)   Ht 160 cm (63\")   Wt 84.8 kg (187 lb)   BMI 33.13 kg/m²   CONSTITUTIONAL: Patient is well-nourished, pleasant and appears stated age.  CV: Heart regular rate and rhythm without murmur, rub, or gallop.  PULMONARY: Lungs are clear to ascultation.  MUSCULOSKELETAL:  Straight leg raising is negative.  James's Sign is negative.  ROM in the low back is met in all directions.  Tenderness in the back to palpation is not observed.  Well-healed small midline lumbar " incision is noted.  NEUROLOGICAL:  Orientation, memory, attention span, language function, and cognition have been examined and are intact.  Strength is intact in the lower extremities to direct testing.  Muscle tone is normal throughout.  Station and gait are normal.  Sensation is intact to light touch testing throughout.  Deep tendon reflexes are trace in her lower extremities except at the left knee where the reflex is 1-2+.  Coordination is intact.      Medical Decision Making    Data Review:   (All imaging studies were personally reviewed unless stated otherwise)  Plain films with flexion-extension views of her lumbar spine dated 11/30/2021 do not demonstrate instability or listhesis.    Postoperative MRI of the lumbar spine dated 11/22/2021 demonstrates a peripherally enhancing fluid collection at the operative site.  There is diffuse degenerative disc disease and facet arthropathy.    Diagnosis:   1.  Chronic mechanical low back pain.  2.  Lumbar stenosis potentially with some degree of neurogenic claudication.  3.  Right hip dysfunction.    Treatment Options:   The patient hashad a procedure done and from what I can gather on the studies it was performed appropriately.  I am not sure that she was provided with reasonable expectations for the surgical endeavor.  I also think she does have some significant right hip dysfunction that is playing a role.  She will need time.  She might benefit from physical therapy.  I am going to refer her to orthopedics for evaluation of her right hip.  Currently, she does not require further neurosurgical intervention.       Diagnosis Plan   1. Mechanical back pain     2. Spinal stenosis of lumbar region with neurogenic claudication     3. Right hip pain         Scribed for Herman Vaughn MD by JUANY Lopez 12/18/2021 10:44 EST      I, Dr. Vaughn, personally performed the services described in the documentation, as scribed in my presence, and it is both accurate and  complete.

## 2022-01-07 ENCOUNTER — LAB (OUTPATIENT)
Dept: LAB | Facility: HOSPITAL | Age: 47
End: 2022-01-07

## 2022-01-07 ENCOUNTER — LAB (OUTPATIENT)
Dept: ENDOCRINOLOGY | Facility: CLINIC | Age: 47
End: 2022-01-07

## 2022-01-07 DIAGNOSIS — R79.89 ELEVATED CORTISOL LEVEL: ICD-10-CM

## 2022-01-07 PROCEDURE — 82570 ASSAY OF URINE CREATININE: CPT

## 2022-01-07 PROCEDURE — 82533 TOTAL CORTISOL: CPT

## 2022-01-07 PROCEDURE — 81050 URINALYSIS VOLUME MEASURE: CPT

## 2022-01-07 PROCEDURE — 82530 CORTISOL FREE: CPT

## 2022-01-08 LAB
COLLECT DURATION TIME UR: 24 HRS
CREAT UR-MCNC: 104.3 MG/DL
CREATINE 24H UR-MRATE: 0.94 G/24 HR (ref 0.7–1.6)
SPECIMEN VOL 24H UR: 900 ML

## 2022-01-12 ENCOUNTER — OFFICE VISIT (OUTPATIENT)
Dept: ORTHOPEDIC SURGERY | Facility: CLINIC | Age: 47
End: 2022-01-12

## 2022-01-12 VITALS
WEIGHT: 186.95 LBS | SYSTOLIC BLOOD PRESSURE: 95 MMHG | HEIGHT: 63 IN | BODY MASS INDEX: 33.12 KG/M2 | DIASTOLIC BLOOD PRESSURE: 60 MMHG

## 2022-01-12 DIAGNOSIS — R22.41 MASS OF RIGHT HIP REGION: ICD-10-CM

## 2022-01-12 DIAGNOSIS — M25.551 RIGHT HIP PAIN: Primary | ICD-10-CM

## 2022-01-12 PROCEDURE — 99214 OFFICE O/P EST MOD 30 MIN: CPT | Performed by: ORTHOPAEDIC SURGERY

## 2022-01-12 RX ORDER — MULTIPLE VITAMINS W/ MINERALS TAB 9MG-400MCG
1 TAB ORAL DAILY
COMMUNITY

## 2022-01-12 NOTE — PROGRESS NOTES
[facility] [place] [facility] Note    Subjective     Chief Complaint   Patient presents with   • Right Hip - Pain        HPI    Eliza Capps is a 46 y.o. female who presents with new problem of: right hip pain.  Onset: May of last year. The issue has been ongoing for 8 month(s). Pain is a 8/10 on the pain scale. Pain is described as aching, burning, throbbing, stabbing and shooting. Associated symptoms include pain, popping, stiffness and giving way/buckling. The pain is worse with walking, standing, sitting, climbing stairs, sleeping, working, leisure, lying on affected side, rising from seated position and any movement of the joint; resting and lying down improve the pain. Previous treatments have included: physical therapy.     She had a gastric sleeve in April 2021, and had a few falls afterwards, but did not have a direct blow to the hip to the best of her knowledge.  She never developed any bruising or swelling in her buttock area in particular.  However, since May 2021, she has had pain with radiating symptoms into the right lower extremity with numbness and tingling.  She has a burning sensation associated with it.  She had lumbar spine surgery with Dr. Dejuan Stubbs at the [facility] in October timeframe, which did not provide significant relief.  She had an x-ray obtained recently that showed a large area of calcification in the posterior aspect of her hip, which was confirmed on the CT scan with a large area of ossification in the posterior hip.  She was referred here for further evaluation.  She continues to have sciatica type symptoms in the right lower extremity.    I have reviewed the following portions of the patient's history and agree with: History of Present Illness and Review of Systems    Patient Active Problem List   Diagnosis   • Abnormal thyroid blood test   • Chronic right-sided low back pain   • Diabetes mellitus, type 2 (HCC)   • Gastroesophageal reflux disease   • Hx of  atrial tachycardia   • DARRYL (obstructive sleep apnea)   • High blood pressure     Past Medical History:   Diagnosis Date   • Clotting disorder (HCC)    • Diabetes mellitus (HCC)    • Headache    • Hyperlipidemia    • Hypertension    • Low back pain    • Osteoporosis    • Sleep apnea       Past Surgical History:   Procedure Laterality Date   • CHOLECYSTECTOMY     • GASTRIC SLEEVE LAPAROSCOPIC  04/26/2021   • LAMINOTOMY  10/07/2021    L2-3 Laminotomy  - Dr. Dejuan Stubbs   • LUMBAR DISCECTOMY  08/2008    L4-5 Discectomy -  Dr. Christopher Thacker   • TUBAL ABDOMINAL LIGATION  11/2002      Family History   Problem Relation Age of Onset   • Hypertension Mother    • Heart disease Mother    • Diabetes Father    • Heart disease Father    • Hypertension Father    • Lung disease Father      Social History     Socioeconomic History   • Marital status:    Tobacco Use   • Smoking status: Never Smoker   • Smokeless tobacco: Never Used   Vaping Use   • Vaping Use: Never used   Substance and Sexual Activity   • Alcohol use: Not Currently   • Drug use: Never   • Sexual activity: Defer      Current Outpatient Medications on File Prior to Visit   Medication Sig Dispense Refill   • atenolol (TENORMIN) 50 MG tablet Take 50 mg by mouth 2 (Two) Times a Day.     • bisacodyl (DULCOLAX) 5 MG EC tablet Take 5 mg by mouth Daily As Needed for Constipation.     • Cholecalciferol 50 MCG (2000 UT) capsule Take 2,000 Units by mouth Daily.     • Cyanocobalamin (B-12 IJ) Inject  as directed Every 30 (Thirty) Days.     • DULoxetine (CYMBALTA) 60 MG capsule Take 60 mg by mouth Daily.     • esomeprazole (nexIUM) 40 MG capsule Take 40 mg by mouth Every Morning Before Breakfast.     • gabapentin (NEURONTIN) 800 MG tablet Take 800 mg by mouth 3 (Three) Times a Day. 800, 400, 800     • HYDROCODONE-ACETAMINOPHEN PO Take 7.5-325 mg/mL by mouth. 15 mL TID     • lisinopril (PRINIVIL,ZESTRIL) 10 MG tablet Take 10 mg by mouth 2 (Two) Times a Day.     •  multivitamin with minerals (HAIR/SKIN/NAILS PO) Take 1 tablet by mouth Daily.     • SUMAtriptan (IMITREX) 100 MG tablet Take 100 mg by mouth As Needed for Migraine. Take one tablet at onset of headache. May repeat dose one time in 2 hours if headache not relieved.     • traZODone (DESYREL) 50 MG tablet Take 50 mg by mouth At Night As Needed for Sleep.     • Unable to find 1 each 2 (Two) Times a Day. Vitamin-  bariatric infusion       No current facility-administered medications on file prior to visit.      No Known Allergies     Review of Systems   Constitutional: Positive for activity change and fatigue. Negative for appetite change, chills, diaphoresis, fever and unexpected weight change.        Night sweats   HENT: Negative for congestion, dental problem, drooling, ear discharge, ear pain, facial swelling, hearing loss, mouth sores, nosebleeds, postnasal drip, rhinorrhea, sinus pressure, sneezing, sore throat, tinnitus, trouble swallowing and voice change.    Eyes: Positive for visual disturbance. Negative for photophobia, pain, discharge, redness and itching.        Dry eyes   Respiratory: Positive for apnea. Negative for cough, choking, chest tightness, shortness of breath, wheezing and stridor.    Cardiovascular: Positive for palpitations and leg swelling. Negative for chest pain.   Gastrointestinal: Negative for abdominal distention, abdominal pain, anal bleeding, blood in stool, constipation, diarrhea, nausea, rectal pain and vomiting.   Endocrine: Negative for cold intolerance, heat intolerance, polydipsia, polyphagia and polyuria.   Genitourinary: Negative for decreased urine volume, difficulty urinating, dysuria, enuresis, flank pain, frequency, genital sores, hematuria and urgency.   Musculoskeletal: Positive for arthralgias and back pain. Negative for gait problem, joint swelling, myalgias, neck pain and neck stiffness.   Skin: Positive for rash. Negative for color change, pallor and wound.  "  Allergic/Immunologic: Negative for environmental allergies, food allergies and immunocompromised state.   Neurological: Positive for weakness. Negative for dizziness, tremors, seizures, syncope, facial asymmetry, speech difficulty, light-headedness, numbness and headaches.   Hematological: Negative for adenopathy. Does not bruise/bleed easily.   Psychiatric/Behavioral: Positive for decreased concentration. Negative for agitation, behavioral problems, confusion, dysphoric mood, hallucinations, self-injury, sleep disturbance and suicidal ideas. The patient is nervous/anxious. The patient is not hyperactive.         Objective      Physical Exam  BP 95/60   Ht 160 cm (62.99\")   Wt 84.8 kg (186 lb 15.2 oz)   BMI 33.13 kg/m²     Body mass index is 33.13 kg/m².    General:   Mental Status:  Alert   Appearance: Cooperative, in no acute distress   Build and Nutrition: Overweight by BMI female   Orientation: Alert and oriented to person, place and time   Posture: Normal   Gait: Limping on the right    Integument:   Right hip: No skin lesions, no rash, no ecchymosis    Neurologic:   Sensation:    Right foot: Decreased sensation on the plantar aspect of the foot   Motor:  Right lower extremity: Intact quadriceps, hamstrings, ankle dorsiflexors, and ankle plantar flexors    Vascular:   Right lower extremity: 2+ dorsalis pedis pulse, prompt capillary refill    Lower Extremities:   Right Hip:    Tenderness:  Posterior gluteal region    Swelling: None    Crepitus:  None    Atrophy:  None    Range of motion:  External Rotation: 30°       Internal Rotation: 30°       Flexion:  90°       Extension:  0°   Instability:  None  Deformities:  Palpable hard soft tissue mass in the gluteal region  Functional testing: Negative Stinchfield    No leg length discrepancy      Imaging/Studies    2 views of the right hip from [place] at [facility] from 2/6/2021 was reviewed, which showed a large soft " tissue calcification in the posterior medial aspect of the hip.  CT scan from 12/10/2021 was also reviewed, which showed a large area of calcification in the posterior right hip musculature.    Assessment and Plan     Diagnoses and all orders for this visit:    1. Right hip pain (Primary)    2. Mass of right hip region  -     Ambulatory Referral to Orthopedic Surgery        1. Right hip pain    2. Mass of right hip region        I reviewed my findings with the patient.  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  She had a plain x-ray obtained in our office on 6/8/2021, when she saw Dr. Freed, which showed no evidence of this soft tissue calcification.  Patient cannot remember a distinct injury to her hip either before or after that x-ray.  I do believe that her neurologic symptoms are related to the location of this large calcified mass.  I would like for her to see an orthopedic oncologist, and we will make a referral for her to see Dr. Munoz for an evaluation.    Return for Referral to the [facility], Dr. James Munoz.      Cuauhtemoc Castillo MD  01/21/22  11:22 EST

## 2022-01-17 LAB
CORTIS BS SAL-MCNC: 0.06 UG/DL
CORTIS SP1 P CHAL SAL-MCNC: 0.1 UG/DL

## 2022-01-18 ENCOUNTER — TELEPHONE (OUTPATIENT)
Dept: ENDOCRINOLOGY | Facility: CLINIC | Age: 47
End: 2022-01-18

## 2022-01-18 ENCOUNTER — TELEPHONE (OUTPATIENT)
Dept: ORTHOPEDIC SURGERY | Facility: CLINIC | Age: 47
End: 2022-01-18

## 2022-01-18 DIAGNOSIS — R22.41 MASS OF RIGHT HIP REGION: Primary | ICD-10-CM

## 2022-01-18 NOTE — TELEPHONE ENCOUNTER
Order for  [facility] is incorrect.Patient says the order says for the left hip and it should say the right hip also 's office said that have not received the referral and would like the updated order faxed to [phone]

## 2022-01-20 LAB
CORTIS F 24H UR-MRATE: 23 UG/24 HR (ref 6–42)
CORTIS F UR-MCNC: 26 UG/L

## 2022-02-24 ENCOUNTER — OFFICE VISIT (OUTPATIENT)
Dept: ENDOCRINOLOGY | Facility: CLINIC | Age: 47
End: 2022-02-24

## 2022-02-24 VITALS
OXYGEN SATURATION: 97 % | HEART RATE: 86 BPM | HEIGHT: 62 IN | DIASTOLIC BLOOD PRESSURE: 78 MMHG | SYSTOLIC BLOOD PRESSURE: 116 MMHG | WEIGHT: 187 LBS | BODY MASS INDEX: 34.41 KG/M2

## 2022-02-24 DIAGNOSIS — H05.20 PROPTOSIS: ICD-10-CM

## 2022-02-24 DIAGNOSIS — R79.89 ELEVATED CORTISOL LEVEL: Primary | ICD-10-CM

## 2022-02-24 DIAGNOSIS — R79.89 ABNORMAL THYROID BLOOD TEST: ICD-10-CM

## 2022-02-24 DIAGNOSIS — E11.9 TYPE 2 DIABETES MELLITUS WITHOUT COMPLICATION, WITHOUT LONG-TERM CURRENT USE OF INSULIN: ICD-10-CM

## 2022-02-24 PROBLEM — R22.41 MASS OF RIGHT HIP REGION: Status: ACTIVE | Noted: 2022-02-24

## 2022-02-24 PROCEDURE — 99214 OFFICE O/P EST MOD 30 MIN: CPT | Performed by: INTERNAL MEDICINE

## 2022-02-24 RX ORDER — DEXAMETHASONE 1 MG
0.5 TABLET ORAL EVERY 6 HOURS SCHEDULED
Qty: 4 TABLET | Refills: 0 | Status: SHIPPED | OUTPATIENT
Start: 2022-02-24 | End: 2022-02-26

## 2022-02-24 RX ORDER — CHLORAL HYDRATE 500 MG
CAPSULE ORAL
COMMUNITY

## 2022-02-24 NOTE — PATIENT INSTRUCTIONS
Take Dexamethasone 0.5 mg 8 AM, 2 PM, 8 PM, 2 AM x 2 days. = 8 total doses. Then after the last dose at 2 AM, have labs drawn at 8 AM.

## 2022-02-24 NOTE — PROGRESS NOTES
"Chief Complaint   Patient presents with   • Thyroid Problem          HPI   Eliza Capps is a 46 y.o. female had concerns including Thyroid Problem.      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 Is seeing Dr. James Munoz - ortho oncology - next week.     She has seen another eye specialist at the [facility] who agreed with Dr. Barnett - eye drops, wipes on eyelids and drops.   None of this is helping her problem. Her eyes are still watering and running.     Is feeling okay otherwise. No weight gain. Still seeing derm to be treated for rosacea.     Urine and salivary cortisol levels were normal.     Is due for repeat thyroid labs. Tells me she was on biotin in hair/nail supplement and for post gastric bypass when last labs were checked.     The following portions of the patient's history were reviewed and updated as appropriate: allergies, current medications, past family history, past medical history, past social history, past surgical history and problem list.      Review of Systems   Constitutional: Negative.    Eyes:        See HPI   Endocrine: Negative.    Musculoskeletal: Positive for arthralgias and back pain.        Physical Exam  Vitals reviewed.   Constitutional:       Appearance: Normal appearance. She is obese.   Cardiovascular:      Rate and Rhythm: Normal rate.   Pulmonary:      Effort: Pulmonary effort is normal.   Neurological:      General: No focal deficit present.      Mental Status: She is alert. Mental status is at baseline.   Psychiatric:         Mood and Affect: Mood normal.         Behavior: Behavior normal.        /78   Pulse 86   Ht 157.5 cm (62\")   Wt 84.8 kg (187 lb)   SpO2 97%   BMI 34.20 kg/m²      Labs and imaging    CMP:  Lab Results   Component Value Date    BUN 10 10/14/2021    CREATININE 0.93 10/14/2021    EGFRIFNONA 65 10/14/2021    BCR 10.8 10/14/2021     10/14/2021    K 4.1 10/14/2021    CO2 25.7 " 10/14/2021    CALCIUM 8.9 10/14/2021    ALBUMIN 4.00 10/14/2021    BILITOT <0.2 10/14/2021    ALKPHOS 289 (H) 10/14/2021    AST 16 10/14/2021    ALT 19 10/14/2021     HbA1c:  Lab Results   Component Value Date    HGBA1C 5.90 (H) 10/14/2021   TSH:  Lab Results   Component Value Date    TSH 1.000 10/14/2021     Lab Results   Component Value Date    TWDEBMGT61NW 23 01/07/2022    CORTSAL 0.058 01/07/2022    SALCORTISOL2 0.098 01/07/2022     Lab Results   Component Value Date    LABCORTAM 14.02 10/14/2021   *after dex suppression      6/3/21 TSH 0.23, free T4 0.71, TPO antibody normal, total T3 57 with a range of , thyroid-stimulating immunoglobulin less than 0.1     6/9/2021 A1c 6.5    11/22/2021 cortisol 9.6, urine creatinine 1495, ACTH 67.4 (drawn at 10:22 AM), urine free cortisol 63 with an upper limit of 42      Assessment and plan  Diagnoses and all orders for this visit:    1. Elevated cortisol level (Primary)  Abnormal dex suppression test with cortisol level 14 after dex.  Preliminary urine cortisol elevated at 63 (11/22/21) but was normal on recheck at 23 (1/7/22). Salivary cortisol is normal x 2.  Clinically she still has facial plethora but no new/worsening signs/symptoms of Cushings.   With recent mass diagnosed in her hip - there may be some physiologic elevation in cortisol levels.   Will repeat two day low dose dex suppression test. Discussed possibility of false positives vs. Cyclic Cushings.   -     dexamethasone (DECADRON) 1 MG tablet; Take 0.5 tablets by mouth Every 6 (Six) Hours for 2 days.  Dispense: 4 tablet; Refill: 0  -     Hemoglobin A1c; Future  -     Hemoglobin A1c; Future  -     Cortisol - AM; Future  -     ACTH; Future    2. Proptosis  Due to suspected antibody negative Graves disease. Has seen Oculoplastics - Dr. Barnett, and is not a candidate for tepezza. Pt is troubled with dry eyes and will follow-up with her local ophthalmologist.     3. Abnormal thyroid blood test  Initial TSH and  T4 were suppressed. Pt was on biotin but unusual to be biotin interference with assay with low T4.   Antibodies normal.   Recheck in a few days after going off biotin. Clinically the pt is euthyroid. Suspect an episode of thyroiditis in the past.   -     T4, Free; Future  -     TSH; Future    4. DM2, without complication  On no meds. History of metformin. Weight loss since gastric bypass last year.   Recheck A1c today.         Return in about 6 months (around 8/24/2022) for next scheduled follow up. The patient was instructed to contact the clinic with any interval questions or concerns.    Milagro Leroy, DO   Endocrinologist    Please note that portions of this note were completed with a voice recognition program.

## 2022-08-29 ENCOUNTER — TELEPHONE (OUTPATIENT)
Dept: ENDOCRINOLOGY | Facility: CLINIC | Age: 47
End: 2022-08-29

## 2022-08-29 DIAGNOSIS — R79.89 ELEVATED CORTISOL LEVEL: Primary | ICD-10-CM

## 2022-08-29 NOTE — TELEPHONE ENCOUNTER
Pt called needing instructions on dexamethasone needs start time. Pt last seen 02/24/22 pt next f/u 08/24/22 pt no showed.

## 2022-08-29 NOTE — TELEPHONE ENCOUNTER
Please advise.  No show 08/24/2022.  Lab orders in chart but do not see order for dexamethasone test.

## 2022-08-29 NOTE — TELEPHONE ENCOUNTER
Dex should be taken at 8 AM, 2 PM, 8 PM, 2 AM x 8 doses. Blood draw 6 hrs after last dose.   Cortisol Dex reflex added to orders. All others can be mailed/faxed to pt.

## 2022-08-31 ENCOUNTER — TELEPHONE (OUTPATIENT)
Dept: ENDOCRINOLOGY | Facility: CLINIC | Age: 47
End: 2022-08-31

## 2022-09-21 ENCOUNTER — TELEPHONE (OUTPATIENT)
Dept: ENDOCRINOLOGY | Facility: CLINIC | Age: 47
End: 2022-09-21

## 2022-09-21 DIAGNOSIS — R79.89 ELEVATED CORTISOL LEVEL: Primary | ICD-10-CM

## 2022-09-21 RX ORDER — DEXAMETHASONE 1 MG
0.5 TABLET ORAL EVERY 6 HOURS
Qty: 4 TABLET | Refills: 0 | Status: SHIPPED | OUTPATIENT
Start: 2022-09-21 | End: 2022-09-23

## 2023-09-11 ENCOUNTER — ANESTHESIA EVENT (OUTPATIENT)
Dept: PERIOP | Facility: HOSPITAL | Age: 48
End: 2023-09-11
Payer: COMMERCIAL

## 2023-09-11 RX ORDER — ARIPIPRAZOLE 10 MG/1
10 TABLET ORAL DAILY
COMMUNITY

## 2023-09-11 RX ORDER — SODIUM CHLORIDE 9 MG/ML
40 INJECTION, SOLUTION INTRAVENOUS AS NEEDED
Status: CANCELLED | OUTPATIENT
Start: 2023-09-11

## 2023-09-11 RX ORDER — SODIUM CHLORIDE 0.9 % (FLUSH) 0.9 %
10 SYRINGE (ML) INJECTION AS NEEDED
Status: CANCELLED | OUTPATIENT
Start: 2023-09-11

## 2023-09-11 RX ORDER — FAMOTIDINE 10 MG/ML
20 INJECTION, SOLUTION INTRAVENOUS ONCE
Status: CANCELLED | OUTPATIENT
Start: 2023-09-11 | End: 2023-09-11

## 2023-09-11 RX ORDER — SODIUM CHLORIDE 0.9 % (FLUSH) 0.9 %
10 SYRINGE (ML) INJECTION EVERY 12 HOURS SCHEDULED
Status: CANCELLED | OUTPATIENT
Start: 2023-09-11

## 2023-09-11 RX ORDER — DEXTROMETHORPHAN HYDROBROMIDE, BUPROPION HYDROCHLORIDE 105; 45 MG/1; MG/1
1 TABLET, MULTILAYER, EXTENDED RELEASE ORAL 2 TIMES DAILY
COMMUNITY

## 2023-09-11 RX ORDER — BUSPIRONE HYDROCHLORIDE 15 MG/1
15 TABLET ORAL 2 TIMES DAILY
COMMUNITY

## 2023-09-12 ENCOUNTER — ANESTHESIA (OUTPATIENT)
Dept: PERIOP | Facility: HOSPITAL | Age: 48
End: 2023-09-12
Payer: COMMERCIAL

## 2023-09-12 ENCOUNTER — ANESTHESIA EVENT CONVERTED (OUTPATIENT)
Dept: ANESTHESIOLOGY | Facility: HOSPITAL | Age: 48
End: 2023-09-12
Payer: COMMERCIAL

## 2023-09-12 ENCOUNTER — APPOINTMENT (OUTPATIENT)
Dept: GENERAL RADIOLOGY | Facility: HOSPITAL | Age: 48
End: 2023-09-12
Payer: COMMERCIAL

## 2023-09-12 ENCOUNTER — HOSPITAL ENCOUNTER (OUTPATIENT)
Facility: HOSPITAL | Age: 48
Discharge: HOME | End: 2023-09-13
Attending: ORTHOPAEDIC SURGERY | Admitting: ORTHOPAEDIC SURGERY
Payer: COMMERCIAL

## 2023-09-12 DIAGNOSIS — S42.291A CLOSED 3-PART FRACTURE OF PROXIMAL HUMERUS, RIGHT, INITIAL ENCOUNTER: Primary | ICD-10-CM

## 2023-09-12 LAB
ANION GAP SERPL CALCULATED.3IONS-SCNC: 7 MMOL/L (ref 5–15)
BUN SERPL-MCNC: 8 MG/DL (ref 6–20)
BUN/CREAT SERPL: 10.4 (ref 7–25)
CALCIUM SPEC-SCNC: 8.7 MG/DL (ref 8.6–10.5)
CHLORIDE SERPL-SCNC: 102 MMOL/L (ref 98–107)
CO2 SERPL-SCNC: 29 MMOL/L (ref 22–29)
CREAT SERPL-MCNC: 0.77 MG/DL (ref 0.57–1)
DEPRECATED RDW RBC AUTO: 56.7 FL (ref 37–54)
EGFRCR SERPLBLD CKD-EPI 2021: 95.3 ML/MIN/1.73
ERYTHROCYTE [DISTWIDTH] IN BLOOD BY AUTOMATED COUNT: 18.9 % (ref 12.3–15.4)
GLUCOSE BLDC GLUCOMTR-MCNC: 105 MG/DL (ref 70–130)
GLUCOSE BLDC GLUCOMTR-MCNC: 130 MG/DL (ref 70–130)
GLUCOSE SERPL-MCNC: 101 MG/DL (ref 65–99)
HBA1C MFR BLD: 5.8 % (ref 4.8–5.6)
HCG INTACT+B SERPL-ACNC: 1.49 MIU/ML
HCT VFR BLD AUTO: 31.8 % (ref 34–46.6)
HGB BLD-MCNC: 9.2 G/DL (ref 12–15.9)
MCH RBC QN AUTO: 24 PG (ref 26.6–33)
MCHC RBC AUTO-ENTMCNC: 28.9 G/DL (ref 31.5–35.7)
MCV RBC AUTO: 83 FL (ref 79–97)
PLATELET # BLD AUTO: 450 10*3/MM3 (ref 140–450)
PMV BLD AUTO: 10.1 FL (ref 6–12)
POTASSIUM SERPL-SCNC: 4.3 MMOL/L (ref 3.5–5.2)
QT INTERVAL: 398 MS
QTC INTERVAL: 413 MS
RBC # BLD AUTO: 3.83 10*6/MM3 (ref 3.77–5.28)
SODIUM SERPL-SCNC: 138 MMOL/L (ref 136–145)
WBC NRBC COR # BLD: 9.03 10*3/MM3 (ref 3.4–10.8)

## 2023-09-12 PROCEDURE — 25010000002 FENTANYL CITRATE (PF) 50 MCG/ML SOLUTION: Performed by: NURSE ANESTHETIST, CERTIFIED REGISTERED

## 2023-09-12 PROCEDURE — 25010000002 BUPIVACAINE (PF) 0.25 % SOLUTION: Performed by: NURSE ANESTHETIST, CERTIFIED REGISTERED

## 2023-09-12 PROCEDURE — C1713 ANCHOR/SCREW BN/BN,TIS/BN: HCPCS | Performed by: ORTHOPAEDIC SURGERY

## 2023-09-12 PROCEDURE — 25010000002 ESMOLOL 100 MG/10ML SOLUTION: Performed by: ANESTHESIOLOGY

## 2023-09-12 PROCEDURE — 25010000002 CEFAZOLIN IN DEXTROSE 2000 MG/ 100 ML SOLUTION: Performed by: ORTHOPAEDIC SURGERY

## 2023-09-12 PROCEDURE — 25010000002 ROPIVACAINE PER 1 MG: Performed by: NURSE ANESTHETIST, CERTIFIED REGISTERED

## 2023-09-12 PROCEDURE — 25010000002 MIDAZOLAM PER 1 MG: Performed by: NURSE ANESTHETIST, CERTIFIED REGISTERED

## 2023-09-12 PROCEDURE — 93005 ELECTROCARDIOGRAM TRACING: CPT | Performed by: ORTHOPAEDIC SURGERY

## 2023-09-12 PROCEDURE — 84702 CHORIONIC GONADOTROPIN TEST: CPT | Performed by: ANESTHESIOLOGY

## 2023-09-12 PROCEDURE — 82948 REAGENT STRIP/BLOOD GLUCOSE: CPT

## 2023-09-12 PROCEDURE — 83036 HEMOGLOBIN GLYCOSYLATED A1C: CPT | Performed by: ORTHOPAEDIC SURGERY

## 2023-09-12 PROCEDURE — 25010000002 CEFTRIAXONE PER 250 MG: Performed by: ORTHOPAEDIC SURGERY

## 2023-09-12 PROCEDURE — 25010000002 PHENYLEPHRINE 10 MG/ML SOLUTION 1 ML VIAL: Performed by: ANESTHESIOLOGY

## 2023-09-12 PROCEDURE — 25010000002 PROPOFOL 10 MG/ML EMULSION: Performed by: ANESTHESIOLOGY

## 2023-09-12 PROCEDURE — 85027 COMPLETE CBC AUTOMATED: CPT | Performed by: ORTHOPAEDIC SURGERY

## 2023-09-12 PROCEDURE — 25010000002 DEXAMETHASONE PER 1 MG: Performed by: ANESTHESIOLOGY

## 2023-09-12 PROCEDURE — 25010000002 ONDANSETRON PER 1 MG: Performed by: ANESTHESIOLOGY

## 2023-09-12 PROCEDURE — 71045 X-RAY EXAM CHEST 1 VIEW: CPT

## 2023-09-12 PROCEDURE — 93010 ELECTROCARDIOGRAM REPORT: CPT | Performed by: INTERNAL MEDICINE

## 2023-09-12 PROCEDURE — 25010000002 SUGAMMADEX 200 MG/2ML SOLUTION: Performed by: ANESTHESIOLOGY

## 2023-09-12 PROCEDURE — 76000 FLUOROSCOPY <1 HR PHYS/QHP: CPT

## 2023-09-12 PROCEDURE — 80048 BASIC METABOLIC PNL TOTAL CA: CPT | Performed by: ORTHOPAEDIC SURGERY

## 2023-09-12 DEVICE — GENEX® BONE GRAFT SUBSTITUTE IS A SIMPLE TO USE SYNTHETIC ABSORBABLE MATERIAL DESIGNED TO PROMOTE REGENERATION OF BONE IN OSSEOUS DEFECTS. IT DEGRADES INTO COMPONENT ELEMENTS NORMALLY FOUND IN THE BODY AND IS HIGHLY BIOCOMPATIBLE. THE KIT CONTAINS A POWDER FILLED SYRINGE AND MIXING SOLUTION WHICH, WHEN COMBINED, PROVIDES A MOULDABLECOHESIVE PASTE WHICH MAY BE IMPLANTED INTO OPEN VOIDS/GAPS OF THE MUSCULOSKELETAL SYSTEM TO SET INSITU OR PLACED IN THE BEAD MOULD MAT PROVIDED TO FORM BEADS FOR IMPLANTATION. WHEN INJECTED THE MIXTURESETS TO FORM GENEX® BONE GRAFT SUBSTITUTE A HARD BUT ABSORBABLE MATRIX. GENEX® BONE GRAFT SUBSTITUTEIS SUPPLIED STERILE. GENEX® BONE GRAFT SUBSTITUTE , ACCESSORIES AND PACKAGING ARE NOT MADE FROM NATURAL RUBBER LATEX.GENEX® BONE GRAFT SUBSTITUTE IS MR SAFE.
Type: IMPLANTABLE DEVICE | Site: HUMERUS | Status: FUNCTIONAL
Brand: GENEX BONE GRAFT SUBSTITUTE

## 2023-09-12 DEVICE — PLT HUM PROX HI RT 3HL 80MM: Type: IMPLANTABLE DEVICE | Site: HUMERUS | Status: FUNCTIONAL

## 2023-09-12 DEVICE — IMPLANTABLE DEVICE: Type: IMPLANTABLE DEVICE | Site: HUMERUS | Status: FUNCTIONAL

## 2023-09-12 DEVICE — SUT NONABS BONE DYNACORD UHMWPE W/OS/6 NDL 2PK STRIP/BLU: Type: IMPLANTABLE DEVICE | Site: HUMERUS | Status: FUNCTIONAL

## 2023-09-12 DEVICE — PEG ALPS LK 3.2X44MM: Type: IMPLANTABLE DEVICE | Site: HUMERUS | Status: FUNCTIONAL

## 2023-09-12 DEVICE — SCRW LP T15 NL 3.5X26MM: Type: IMPLANTABLE DEVICE | Site: HUMERUS | Status: FUNCTIONAL

## 2023-09-12 RX ORDER — PHENYLEPHRINE HCL IN 0.9% NACL 1 MG/10 ML
SYRINGE (ML) INTRAVENOUS AS NEEDED
Status: DISCONTINUED | OUTPATIENT
Start: 2023-09-12 | End: 2023-09-12 | Stop reason: SURG

## 2023-09-12 RX ORDER — ONDANSETRON 4 MG/1
4 TABLET, FILM COATED ORAL EVERY 8 HOURS PRN
Qty: 12 TABLET | Refills: 0 | Status: SHIPPED | OUTPATIENT
Start: 2023-09-12

## 2023-09-12 RX ORDER — ROPIVACAINE HYDROCHLORIDE 2 MG/ML
INJECTION, SOLUTION EPIDURAL; INFILTRATION; PERINEURAL CONTINUOUS
Status: DISCONTINUED | OUTPATIENT
Start: 2023-09-12 | End: 2023-09-13 | Stop reason: HOSPADM

## 2023-09-12 RX ORDER — DOCUSATE SODIUM 100 MG/1
100 CAPSULE, LIQUID FILLED ORAL 2 TIMES DAILY
Qty: 20 CAPSULE | Refills: 0 | Status: SHIPPED | OUTPATIENT
Start: 2023-09-12

## 2023-09-12 RX ORDER — DEXAMETHASONE SODIUM PHOSPHATE 4 MG/ML
INJECTION, SOLUTION INTRA-ARTICULAR; INTRALESIONAL; INTRAMUSCULAR; INTRAVENOUS; SOFT TISSUE AS NEEDED
Status: DISCONTINUED | OUTPATIENT
Start: 2023-09-12 | End: 2023-09-12 | Stop reason: SURG

## 2023-09-12 RX ORDER — PROPOFOL 10 MG/ML
VIAL (ML) INTRAVENOUS AS NEEDED
Status: DISCONTINUED | OUTPATIENT
Start: 2023-09-12 | End: 2023-09-12 | Stop reason: SURG

## 2023-09-12 RX ORDER — MAGNESIUM HYDROXIDE 1200 MG/15ML
LIQUID ORAL AS NEEDED
Status: DISCONTINUED | OUTPATIENT
Start: 2023-09-12 | End: 2023-09-12 | Stop reason: HOSPADM

## 2023-09-12 RX ORDER — ONDANSETRON 2 MG/ML
INJECTION INTRAMUSCULAR; INTRAVENOUS AS NEEDED
Status: DISCONTINUED | OUTPATIENT
Start: 2023-09-12 | End: 2023-09-12 | Stop reason: SURG

## 2023-09-12 RX ORDER — HYDROCODONE BITARTRATE AND ACETAMINOPHEN 7.5; 325 MG/1; MG/1
1 TABLET ORAL EVERY 4 HOURS PRN
Status: DISCONTINUED | OUTPATIENT
Start: 2023-09-12 | End: 2023-09-13 | Stop reason: HOSPADM

## 2023-09-12 RX ORDER — GLYCOPYRROLATE 0.2 MG/ML
INJECTION INTRAMUSCULAR; INTRAVENOUS AS NEEDED
Status: DISCONTINUED | OUTPATIENT
Start: 2023-09-12 | End: 2023-09-12 | Stop reason: SURG

## 2023-09-12 RX ORDER — FENTANYL CITRATE 50 UG/ML
50 INJECTION, SOLUTION INTRAMUSCULAR; INTRAVENOUS
Status: DISCONTINUED | OUTPATIENT
Start: 2023-09-12 | End: 2023-09-12 | Stop reason: HOSPADM

## 2023-09-12 RX ORDER — NALOXONE HCL 0.4 MG/ML
0.1 VIAL (ML) INJECTION
Status: DISCONTINUED | OUTPATIENT
Start: 2023-09-12 | End: 2023-09-13 | Stop reason: HOSPADM

## 2023-09-12 RX ORDER — SODIUM CHLORIDE, SODIUM LACTATE, POTASSIUM CHLORIDE, CALCIUM CHLORIDE 600; 310; 30; 20 MG/100ML; MG/100ML; MG/100ML; MG/100ML
9 INJECTION, SOLUTION INTRAVENOUS CONTINUOUS
Status: DISCONTINUED | OUTPATIENT
Start: 2023-09-12 | End: 2023-09-13 | Stop reason: HOSPADM

## 2023-09-12 RX ORDER — TRANEXAMIC ACID 10 MG/ML
1000 INJECTION, SOLUTION INTRAVENOUS ONCE
Status: DISCONTINUED | OUTPATIENT
Start: 2023-09-12 | End: 2023-09-12 | Stop reason: HOSPADM

## 2023-09-12 RX ORDER — DULOXETIN HYDROCHLORIDE 60 MG/1
60 CAPSULE, DELAYED RELEASE ORAL 2 TIMES DAILY
Status: DISCONTINUED | OUTPATIENT
Start: 2023-09-12 | End: 2023-09-13 | Stop reason: HOSPADM

## 2023-09-12 RX ORDER — LIDOCAINE HYDROCHLORIDE 10 MG/ML
INJECTION, SOLUTION EPIDURAL; INFILTRATION; INTRACAUDAL; PERINEURAL AS NEEDED
Status: DISCONTINUED | OUTPATIENT
Start: 2023-09-12 | End: 2023-09-12 | Stop reason: SURG

## 2023-09-12 RX ORDER — FENTANYL CITRATE 50 UG/ML
INJECTION, SOLUTION INTRAMUSCULAR; INTRAVENOUS
Status: COMPLETED | OUTPATIENT
Start: 2023-09-12 | End: 2023-09-12

## 2023-09-12 RX ORDER — LIDOCAINE HYDROCHLORIDE 10 MG/ML
0.5 INJECTION, SOLUTION EPIDURAL; INFILTRATION; INTRACAUDAL; PERINEURAL ONCE AS NEEDED
Status: COMPLETED | OUTPATIENT
Start: 2023-09-12 | End: 2023-09-12

## 2023-09-12 RX ORDER — FAMOTIDINE 20 MG/1
20 TABLET, FILM COATED ORAL ONCE
Status: COMPLETED | OUTPATIENT
Start: 2023-09-12 | End: 2023-09-12

## 2023-09-12 RX ORDER — ONDANSETRON 2 MG/ML
4 INJECTION INTRAMUSCULAR; INTRAVENOUS EVERY 6 HOURS PRN
Status: DISCONTINUED | OUTPATIENT
Start: 2023-09-12 | End: 2023-09-13 | Stop reason: HOSPADM

## 2023-09-12 RX ORDER — ONDANSETRON 4 MG/1
4 TABLET, FILM COATED ORAL EVERY 6 HOURS PRN
Status: DISCONTINUED | OUTPATIENT
Start: 2023-09-12 | End: 2023-09-13 | Stop reason: HOSPADM

## 2023-09-12 RX ORDER — ARIPIPRAZOLE 10 MG/1
10 TABLET ORAL DAILY
Status: DISCONTINUED | OUTPATIENT
Start: 2023-09-13 | End: 2023-09-13 | Stop reason: HOSPADM

## 2023-09-12 RX ORDER — TRANEXAMIC ACID 10 MG/ML
INJECTION, SOLUTION INTRAVENOUS AS NEEDED
Status: DISCONTINUED | OUTPATIENT
Start: 2023-09-12 | End: 2023-09-12 | Stop reason: SURG

## 2023-09-12 RX ORDER — CEFAZOLIN SODIUM 2 G/100ML
2000 INJECTION, SOLUTION INTRAVENOUS ONCE
Status: COMPLETED | OUTPATIENT
Start: 2023-09-12 | End: 2023-09-12

## 2023-09-12 RX ORDER — CEFAZOLIN SODIUM 2 G/100ML
2000 INJECTION, SOLUTION INTRAVENOUS EVERY 8 HOURS
Status: DISCONTINUED | OUTPATIENT
Start: 2023-09-13 | End: 2023-09-13 | Stop reason: HOSPADM

## 2023-09-12 RX ORDER — BUPIVACAINE HYDROCHLORIDE 2.5 MG/ML
INJECTION, SOLUTION EPIDURAL; INFILTRATION; INTRACAUDAL
Status: COMPLETED | OUTPATIENT
Start: 2023-09-12 | End: 2023-09-12

## 2023-09-12 RX ORDER — PANTOPRAZOLE SODIUM 40 MG/1
40 TABLET, DELAYED RELEASE ORAL
Status: DISCONTINUED | OUTPATIENT
Start: 2023-09-13 | End: 2023-09-13 | Stop reason: HOSPADM

## 2023-09-12 RX ORDER — MIDAZOLAM HYDROCHLORIDE 1 MG/ML
INJECTION INTRAMUSCULAR; INTRAVENOUS
Status: COMPLETED | OUTPATIENT
Start: 2023-09-12 | End: 2023-09-12

## 2023-09-12 RX ORDER — TRAZODONE HYDROCHLORIDE 50 MG/1
50 TABLET ORAL NIGHTLY PRN
Status: DISCONTINUED | OUTPATIENT
Start: 2023-09-12 | End: 2023-09-13 | Stop reason: HOSPADM

## 2023-09-12 RX ORDER — BUSPIRONE HYDROCHLORIDE 10 MG/1
15 TABLET ORAL 2 TIMES DAILY
Status: DISCONTINUED | OUTPATIENT
Start: 2023-09-12 | End: 2023-09-13 | Stop reason: HOSPADM

## 2023-09-12 RX ORDER — MIDAZOLAM HYDROCHLORIDE 1 MG/ML
1 INJECTION INTRAMUSCULAR; INTRAVENOUS
Status: DISCONTINUED | OUTPATIENT
Start: 2023-09-12 | End: 2023-09-12 | Stop reason: HOSPADM

## 2023-09-12 RX ORDER — HYDROMORPHONE HYDROCHLORIDE 1 MG/ML
0.5 INJECTION, SOLUTION INTRAMUSCULAR; INTRAVENOUS; SUBCUTANEOUS
Status: DISCONTINUED | OUTPATIENT
Start: 2023-09-12 | End: 2023-09-13 | Stop reason: HOSPADM

## 2023-09-12 RX ORDER — ATENOLOL 50 MG/1
50 TABLET ORAL 2 TIMES DAILY
Status: DISCONTINUED | OUTPATIENT
Start: 2023-09-12 | End: 2023-09-13 | Stop reason: HOSPADM

## 2023-09-12 RX ORDER — GABAPENTIN 400 MG/1
800 CAPSULE ORAL EVERY 8 HOURS SCHEDULED
Status: DISCONTINUED | OUTPATIENT
Start: 2023-09-12 | End: 2023-09-13 | Stop reason: HOSPADM

## 2023-09-12 RX ORDER — OXYCODONE HYDROCHLORIDE 5 MG/1
5 TABLET ORAL EVERY 4 HOURS PRN
Qty: 24 TABLET | Refills: 0 | Status: SHIPPED | OUTPATIENT
Start: 2023-09-12

## 2023-09-12 RX ORDER — SUMATRIPTAN 50 MG/1
100 TABLET, FILM COATED ORAL AS NEEDED
Status: DISCONTINUED | OUTPATIENT
Start: 2023-09-12 | End: 2023-09-13 | Stop reason: HOSPADM

## 2023-09-12 RX ORDER — ROCURONIUM BROMIDE 10 MG/ML
INJECTION, SOLUTION INTRAVENOUS AS NEEDED
Status: DISCONTINUED | OUTPATIENT
Start: 2023-09-12 | End: 2023-09-12 | Stop reason: SURG

## 2023-09-12 RX ORDER — HYDROMORPHONE HYDROCHLORIDE 1 MG/ML
0.5 INJECTION, SOLUTION INTRAMUSCULAR; INTRAVENOUS; SUBCUTANEOUS
Status: DISCONTINUED | OUTPATIENT
Start: 2023-09-12 | End: 2023-09-12 | Stop reason: HOSPADM

## 2023-09-12 RX ORDER — ESMOLOL HYDROCHLORIDE 10 MG/ML
INJECTION INTRAVENOUS AS NEEDED
Status: DISCONTINUED | OUTPATIENT
Start: 2023-09-12 | End: 2023-09-12 | Stop reason: SURG

## 2023-09-12 RX ORDER — DOCUSATE SODIUM 100 MG/1
100 CAPSULE, LIQUID FILLED ORAL 2 TIMES DAILY
Status: DISCONTINUED | OUTPATIENT
Start: 2023-09-12 | End: 2023-09-13 | Stop reason: HOSPADM

## 2023-09-12 RX ADMIN — HYDROCODONE BITARTRATE AND ACETAMINOPHEN 1 TABLET: 7.5; 325 TABLET ORAL at 22:15

## 2023-09-12 RX ADMIN — SODIUM CHLORIDE, POTASSIUM CHLORIDE, SODIUM LACTATE AND CALCIUM CHLORIDE 9 ML/HR: 600; 310; 30; 20 INJECTION, SOLUTION INTRAVENOUS at 13:00

## 2023-09-12 RX ADMIN — Medication 1000 MG: at 19:11

## 2023-09-12 RX ADMIN — ROCURONIUM BROMIDE 20 MG: 10 SOLUTION INTRAVENOUS at 18:06

## 2023-09-12 RX ADMIN — MIDAZOLAM HYDROCHLORIDE 2 MG: 1 INJECTION, SOLUTION INTRAMUSCULAR; INTRAVENOUS at 13:59

## 2023-09-12 RX ADMIN — DULOXETINE HYDROCHLORIDE 60 MG: 60 CAPSULE, DELAYED RELEASE ORAL at 22:15

## 2023-09-12 RX ADMIN — DOCUSATE SODIUM 100 MG: 100 CAPSULE, LIQUID FILLED ORAL at 22:15

## 2023-09-12 RX ADMIN — TRANEXAMIC ACID 1000 MG: 10 INJECTION, SOLUTION INTRAVENOUS at 16:49

## 2023-09-12 RX ADMIN — CEFAZOLIN SODIUM 2000 MG: 2 INJECTION, SOLUTION INTRAVENOUS at 16:35

## 2023-09-12 RX ADMIN — FAMOTIDINE 20 MG: 20 TABLET ORAL at 13:23

## 2023-09-12 RX ADMIN — FENTANYL CITRATE 200 MCG: 50 INJECTION, SOLUTION INTRAMUSCULAR; INTRAVENOUS at 13:20

## 2023-09-12 RX ADMIN — BUPIVACAINE HYDROCHLORIDE 15 ML: 2.5 INJECTION, SOLUTION EPIDURAL; INFILTRATION; INTRACAUDAL; PERINEURAL at 13:20

## 2023-09-12 RX ADMIN — Medication 200 MCG: at 16:55

## 2023-09-12 RX ADMIN — GABAPENTIN 800 MG: 400 CAPSULE ORAL at 22:15

## 2023-09-12 RX ADMIN — Medication 200 MCG: at 16:46

## 2023-09-12 RX ADMIN — ONDANSETRON 4 MG: 2 INJECTION INTRAMUSCULAR; INTRAVENOUS at 18:49

## 2023-09-12 RX ADMIN — Medication 200 MCG: at 17:01

## 2023-09-12 RX ADMIN — DEXAMETHASONE SODIUM PHOSPHATE 4 MG: 4 INJECTION, SOLUTION INTRAMUSCULAR; INTRAVENOUS at 16:41

## 2023-09-12 RX ADMIN — LIDOCAINE HYDROCHLORIDE 0.2 ML: 10 INJECTION, SOLUTION EPIDURAL; INFILTRATION; INTRACAUDAL; PERINEURAL at 13:00

## 2023-09-12 RX ADMIN — PHENYLEPHRINE HYDROCHLORIDE 1 MCG/KG/MIN: 10 INJECTION INTRAVENOUS at 17:11

## 2023-09-12 RX ADMIN — Medication 200 MCG: at 17:07

## 2023-09-12 RX ADMIN — LIDOCAINE HYDROCHLORIDE 50 MG: 10 INJECTION, SOLUTION EPIDURAL; INFILTRATION; INTRACAUDAL; PERINEURAL at 16:35

## 2023-09-12 RX ADMIN — ESMOLOL HYDROCHLORIDE 50 MG: 100 INJECTION, SOLUTION INTRAVENOUS at 16:35

## 2023-09-12 RX ADMIN — TRANEXAMIC ACID 1000 MG: 10 INJECTION, SOLUTION INTRAVENOUS at 18:49

## 2023-09-12 RX ADMIN — BUSPIRONE HYDROCHLORIDE 15 MG: 10 TABLET ORAL at 22:14

## 2023-09-12 RX ADMIN — ROCURONIUM BROMIDE 20 MG: 10 SOLUTION INTRAVENOUS at 17:21

## 2023-09-12 RX ADMIN — SUGAMMADEX 200 MG: 100 INJECTION, SOLUTION INTRAVENOUS at 18:56

## 2023-09-12 RX ADMIN — PROPOFOL 150 MG: 10 INJECTION, EMULSION INTRAVENOUS at 16:35

## 2023-09-12 RX ADMIN — GLYCOPYRROLATE 0.2 MG: 0.2 INJECTION INTRAMUSCULAR; INTRAVENOUS at 16:53

## 2023-09-12 RX ADMIN — ROCURONIUM BROMIDE 50 MG: 10 SOLUTION INTRAVENOUS at 16:35

## 2023-09-12 RX ADMIN — ATENOLOL 50 MG: 50 TABLET ORAL at 22:16

## 2023-09-12 RX ADMIN — Medication 200 MCG: at 16:49

## 2023-09-12 NOTE — ANESTHESIA PREPROCEDURE EVALUATION
Anesthesia Evaluation                  Airway   Mallampati: I  TM distance: >3 FB  No difficulty expected  Dental      Pulmonary    (+) ,sleep apnea  Cardiovascular     ECG reviewed    (+) hypertension, hyperlipidemia      Neuro/Psych  (+) headaches  GI/Hepatic/Renal/Endo    (+) GERD, diabetes mellitus    Musculoskeletal     Abdominal    Substance History      OB/GYN          Other                      Anesthesia Plan    ASA 3     general     (Right isnb/c marked)  intravenous induction     Anesthetic plan, risks, benefits, and alternatives have been provided, discussed and informed consent has been obtained with: patient.    Plan discussed with CRNA.    CODE STATUS:

## 2023-09-12 NOTE — ANESTHESIA PROCEDURE NOTES
Intersclene cath      Patient reassessed immediately prior to procedure    Patient location during procedure: pre-op  Reason for block: at surgeon's request and post-op pain management  Performed by  Anesthesiologist: William Ramos MD  Preanesthetic Checklist  Completed: patient identified, IV checked, site marked, risks and benefits discussed, surgical consent, monitors and equipment checked, pre-op evaluation and timeout performed  Prep:  Sterile barriers:cap, gloves, mask and washed/disinfected hands  Prep: ChloraPrep  Patient monitoring: blood pressure monitoring, continuous pulse oximetry and EKG  Procedure    Sedation: yes  Performed under: local infiltration  Guidance:ultrasound guided    ULTRASOUND INTERPRETATION.  Using ultrasound guidance a 20 G gauge needle was placed in close proximity to the nerve, at which point, under ultrasound guidance anesthetic was injected in the area of the nerve and spread of the anesthesia was seen on ultrasound in close proximity thereto.  There were no abnormalities seen on ultrasound; a digital image was taken; and the patient tolerated the procedure with no complications. Images:still images obtained, printed/placed on chart    Laterality:right  Block Type:interscalene  Injection Technique:catheter  Needle Type:Tuohy and echogenic  Needle Gauge:18 G  Resistance on Injection: none  Catheter Size:20 G (20g)  Cath Depth at skin: 6 cm    Medications Used: bupivacaine PF (MARCAINE) 0.25 % injection - Injection   15 mL - 9/12/2023 1:20:00 PM  fentaNYL citrate (PF) (SUBLIMAZE) injection - Intravenous   200 mcg - 9/12/2023 1:20:00 PM  midazolam (VERSED) injection - Intravenous   2 mg - 9/12/2023 1:59:00 PM      Post Assessment  Injection Assessment: negative aspiration for heme, no paresthesia on injection and incremental injection  Patient Tolerance:comfortable throughout block  Complications:no  Additional Notes  CATHETER  A high-frequency linear transducer, with sterile  "cover, was placed in the supraclavicular fossa to identify the subclavian artery and trunks and divisions of the brachial plexus. The transducer was then moved in a cephalad orientation with a slight rotation to continue visualization of the brachial plexus from the trunks and divisions, on to the C5-C7 roots. The insertion site was prepped and draped in sterile fashion. Skin and cutaneous tissue was infiltrated with 2-5 ml of 1% Lidocaine. Using ultrasound-guidance, an 18-gauge Contiplex Ultra 360 Touhy needle was advanced in plane from lateral to medial. Preservative-free normal saline was utilized for hydro-dissection of tissue, advancement of Touhy, and to confirm final needle placement at the fascial plane between the middle scalene muscle and sheath of the brachial plexus (C5-C7). A 20-gauge Contiplex Echo catheter was placed through the needle and advance out the tip of the Touhy 3-5 cm with the \"Villaseñor Flip\". The Touhy needle was then removed, and final catheter position verified lateral to the brachial plexus with local anesthetic (LA) and ultrasound visualization. The catheter was secured in the usual fashion with skin glue, benzoin, steri-strips, CHG tegaderm and label noting \"Nerve Block Catheter\". Jerk tape applied at yellow connector and catheter connection. All LA was injected in increments of 3-5 ml after catheter secured. Aspiration every 5 ml to prevent intravascular injection. Injection was completed with negative aspiration of blood and negative intravascular injection. Injection pressures were normal with minimal resistance.           "

## 2023-09-12 NOTE — NURSING NOTE
Quantitative hcg results 1.7. Dr. Ramos notified. Conversation between Dr. Ramos and patient regarding status of menopause. Per Dr. Ramos pt is pre menopausal and okay to proceed with surgery.

## 2023-09-12 NOTE — ANESTHESIA POSTPROCEDURE EVALUATION
Patient: Eliza Capps    Procedure Summary       Date: 09/12/23 Room / Location:  [facility]  /  [facility]    Anesthesia Start: 1628 Anesthesia Stop: 1907    Procedure: HUMERUS PROXIMAL OPEN REDUCTION INTERNAL FIXATION (Right: Arm Upper) Diagnosis:     Surgeons: Christian Abarca Jr., MD Provider: Johnny Xiao MD    Anesthesia Type: general ASA Status: 3            Anesthesia Type: general    Vitals  Vitals Value Taken Time   /69 09/12/23 1908   Temp 97.9 °F (36.6 °C) 09/12/23 1908   Pulse 82 09/12/23 1908   Resp     SpO2 97 % 09/12/23 1908           Post Anesthesia Care and Evaluation    Patient location during evaluation: PACU  Patient participation: complete - patient participated  Level of consciousness: awake and alert  Pain management: adequate    Airway patency: patent  Anesthetic complications: No anesthetic complications  PONV Status: none  Cardiovascular status: hemodynamically stable and acceptable  Respiratory status: nonlabored ventilation, acceptable and nasal cannula  Hydration status: acceptable

## 2023-09-12 NOTE — H&P
Pre-Op H&P  Eliza Capps  [id number]  1975      Chief complaint: Right arm fracture      Subjective:  Patient is a 47 y.o.female presents for scheduled surgery by Dr. Abarca. She anticipates a HUMERUS PROXIMAL OPEN REDUCTION INTERNAL FIXATION  today. She had a fall at [facility] on 9/7/23 and injured her right arm. She was taken to ER and was found to have a right humerus fracture. She was placed in a sling.       Review of Systems:  Constitutional-- No fever, chills or sweats. No fatigue.  CV-- No chest pain, palpitation or syncope. +HTN, HLD  Resp-- No SOB, cough, hemoptysis. +DARRYL on cpap   Skin--No rashes or lesions      Allergies: No Known Allergies      Home Meds:  Medications Prior to Admission   Medication Sig Dispense Refill Last Dose    ARIPiprazole (ABILIFY) 10 MG tablet Take 1 tablet by mouth Daily.   9/11/2023    atenolol (TENORMIN) 50 MG tablet Take 1 tablet by mouth 2 (Two) Times a Day.   9/12/2023 at 0930    busPIRone (BUSPAR) 15 MG tablet Take 1 tablet by mouth 2 (Two) Times a Day.   9/11/2023    Dextromethorphan-buPROPion ER (Auvelity)  MG tablet controlled-release Take 1 tablet by mouth 2 (Two) Times a Day.   9/11/2023    DULoxetine (CYMBALTA) 60 MG capsule Take 1 capsule by mouth 2 (Two) Times a Day.   9/11/2023    esomeprazole (nexIUM) 40 MG capsule Take 1 capsule by mouth Every Morning Before Breakfast.   9/11/2023    gabapentin (NEURONTIN) 800 MG tablet Take 1 tablet by mouth 3 (Three) Times a Day.   9/11/2023    HYDROCODONE-ACETAMINOPHEN PO Take 7.5-325 mg by mouth Every 4 (Four) Hours As Needed.   9/12/2023 at 0900    linaclotide (LINZESS) 290 MCG capsule capsule Take 1 capsule by mouth Every Morning Before Breakfast.   9/11/2023    Omega-3 Fatty Acids (fish oil) 1000 MG capsule capsule Take 1 capsule by mouth Daily With Breakfast.   9/11/2023    traZODone (DESYREL) 50 MG tablet Take 1 tablet by mouth At Night As Needed for Sleep.   9/11/2023    bisacodyl (DULCOLAX) 5 MG EC  "tablet Take 1 tablet by mouth Daily As Needed for Constipation.       Cholecalciferol 50 MCG (2000 UT) capsule Take 1 capsule by mouth Daily.       Cyanocobalamin (B-12 IJ) Inject  as directed Every 30 (Thirty) Days.   8/12/2023    Elderberry 575 MG/5ML syrup Take  by mouth.       lisinopril (PRINIVIL,ZESTRIL) 10 MG tablet Take 1 tablet by mouth 2 (Two) Times a Day.       multivitamin with minerals tablet tablet Take 1 tablet by mouth Daily.       SUMAtriptan (IMITREX) 100 MG tablet Take 1 tablet by mouth As Needed for Migraine. Take one tablet at onset of headache. May repeat dose one time in 2 hours if headache not relieved.   More than a month    Unable to find 1 each 2 (Two) Times a Day. Vitamin-  bariatric infusion   9/10/2023         PMH:   Past Medical History:   Diagnosis Date    Constipation     Diabetes mellitus     Diabetic neuropathy     Elevated cholesterol     Hyperlipidemia     Hypertension     Low back pain     Migraine     Sleep apnea     uses cpap     PSH:    Past Surgical History:   Procedure Laterality Date    CHOLECYSTECTOMY      GASTRIC SLEEVE LAPAROSCOPIC  04/26/2021    LAMINECTOMY  10/2021    at  with Dr. Stubbs    LUMBAR DISCECTOMY  08/2008    L4-5 Discectomy -  Dr. Christopher Thacker    TUBAL ABDOMINAL LIGATION  11/2002       Immunization History:  Influenza: 2022  Pneumococcal: No  Tetanus: UTD  Covid : x4    Social History:   Tobacco:   Social History     Tobacco Use   Smoking Status Never   Smokeless Tobacco Never      Alcohol:     Social History     Substance and Sexual Activity   Alcohol Use Not Currently         Physical Exam: VS: /84  HR 70  RR 16  T 98.3  Sat 97%RA  Ht 160 cm (63\")   Wt 75.3 kg (166 lb)   BMI 29.41 kg/m²       General Appearance:    Alert, cooperative, no distress, appears stated age   Head:    Normocephalic, without obvious abnormality, atraumatic   Lungs:     Clear to auscultation bilaterally, respirations unlabored    Heart:   Regular rate and rhythm, S1 " and S2 normal    Abdomen:    Soft without tenderness   Extremities:   RUE sling. Edema and ecchymosis noted. Good movement and cap refill right digits    Skin:   Skin color, texture, turgor normal, no rashes or lesions   Neurologic:   Grossly intact     Results Review:     LABS:  Lab Results   Component Value Date    WBC 13.70 (H) 03/12/2022    HGB 10.4 (L) 03/12/2022    HCT 33.8 (L) 03/12/2022    MCV 90 03/12/2022     (H) 03/12/2022    NEUTROABS 9.78 (H) 03/12/2022    GLUCOSE 137 (H) 10/14/2021    BUN 12 03/11/2022    CREATININE 0.74 03/11/2022    EGFRIFNONA >60 03/11/2022    EGFRIFAFRI >60 03/11/2022     03/11/2022    K 4.7 03/11/2022     03/11/2022    CO2 24 03/11/2022    CALCIUM 8.8 (L) 03/11/2022    ALBUMIN 4.00 10/14/2021    AST 16 10/14/2021    ALT 19 10/14/2021    BILITOT <0.2 10/14/2021       RADIOLOGY:  Imaging Results (Last 72 Hours)       Procedure Component Value Units Date/Time    XR Chest 1 View [[id number]] Resulted: 09/12/23 1311     Updated: 09/12/23 1311            I reviewed the patient's new clinical results.    Cancer Staging (if applicable)  Cancer Patient: __ yes __no __unknown; If yes, clinical stage T:__ N:__M:__, stage group or __N/A      Impression: right humerus fracture       Plan: HUMERUS PROXIMAL OPEN REDUCTION INTERNAL FIXATION       Mallory Dickerson, SHELBIE   9/12/2023   13:22 EDT

## 2023-09-12 NOTE — ANESTHESIA PROCEDURE NOTES
Airway  Urgency: elective    Date/Time: 9/12/2023 4:36 PM  Airway not difficult    General Information and Staff    Patient location during procedure: OR  Anesthesiologist: Johnny Xiao MD    Indications and Patient Condition  Indications for airway management: airway protection    Preoxygenated: yes  MILS not maintained throughout  Mask difficulty assessment: 1 - vent by mask    Final Airway Details  Final airway type: endotracheal airway      Successful airway: ETT  Cuffed: yes   Successful intubation technique: direct laryngoscopy  Endotracheal tube insertion site: oral  Blade: Adria  Blade size: 3  ETT size (mm): 7.0  Cormack-Lehane Classification: grade I - full view of glottis  Placement verified by: chest auscultation and capnometry   Measured from: lips  ETT/EBT  to lips (cm): 20  Number of attempts at approach: 1  Assessment: lips, teeth, and gum same as pre-op and atraumatic intubation    Additional Comments  Negative epigastric sounds, Breath sound equal bilaterally with symmetric chest rise and fall

## 2023-09-12 NOTE — DISCHARGE INSTRUCTIONS
InfuBLOCK - Patient Information    What is a pain pump?  The InfuBLOCK pump delivers post-operative, non-narcotic, numbing medication to the nerve near the surgical site for pain relief.     Where can I find information about my pain pump?           For more information about your pain pump, scan the QR code.  For additional patient resources, visit [email or web address].                                                                                               While your physician is your primary source for information about your treatment there may be times during your treatment that you need assistance with your infusion pump.     If you need assistance take the following steps:    The My Dentist Nursing Hotline is Here for You 24/7.  Please call [phone] for the following concerns or complications:    Answers to questions about your infusion pump                 Tubing disconnect  Assistance with pump alarms                                                      Dislodged catheter  Excessive leakage noted from pump                                         Inadequate pain control    2.   [facility] Anesthesia Acute Pain Service: [phone] is available 24/7 for any further needs or concerns about medication or pain control.     -------------------------------------------------------------------------    Nerve Catheter Removal Instructions  When your device is empty:    Remove your catheter by pulling the dressing off slowly (like you would remove a regular bandage). The catheter should pull right out of the skin.  Check that the BLUE tip is intact.                                                                                     If the catheter is stuck, reposition your   extremity and pull slowly until removed.  *If catheter is HURTING and WON'T come out, stop and call [phone] for further assistance.    Remove medication bag from the black carrying case.  Cut the  tubing on right and left side of pump, and discard the medication bag and tubing into garbage.  Place the pump and black carrying case into the plastic bag and then place this into the return box.  Seal box with blue stickers and return to [employer] postal service. THIS IS PRE-PAID POSTAGE.        -------------------------------------------------------------------------    [facility] COLD THERAPY - PATIENT INSTRUCTION SHEET    Cold Compression Therapy for your comfort and rehabilitation  Your caregivers want you to be productive in your rehab and comfortable during your stay. In keeping with those goals, you will be receiving an SMI Cold Therapy Wrap to help ease post-operative pain and swelling that might keep you from getting back on track! Your SMI Cold Therapy Wrap is effective and simple-to-use, and you will be encouraged to apply it throughout your hospital stay and at home through the duration of your recovery.    When you are ready to go home  Be sure to take your SMI Cold Therapy Wrap and both sets of Gel Bags with you for continued comfort and use throughout your rehabilitation. If you don't already have them, ask your nurse or aide to retrieve your SMI Gel Bags from the patient freezer.    Home use precautions  Always follow your medical professional's application instructions upon discharge. Your SMI Cold Therapy Wrap and Gel Bags are designed to last for months following your surgery. Never heat the Gel Bags unless specified by your healthcare provider. Supervision is advised when using this product on children or geriatric patients. To avoid danger of suffocation, please keep the outer plastic packaging away from children & pets.    Cold Therapy Instructions  Place Gel Bags in a freezer set ¾ of the way to max temperature for at least (4) hours. For best results, lay the Gel Bags flat and [id number] in the freezer. Once frozen, slide Gel Bags into the gel pouch and secure your wrap to the affected area with the  straps.  Gel wraps that have been stored in a freezer for an extended period of time may require a (10) minute period of softening up in a room temperature environment before application.  The gel pouch acts as a protective barrier. NEVER place frozen bags directly onto skin, as this may cause frostbite injury.  The [facility] Cold Therapy Wrap is designed to be able to be worm while ambulating. The compression straps can be secured well enough so that the Wrap won't fall off while moving.  Wrap Application Videos can be viewed at [email or web address].  An additional protective barrier such as clothing, a washcloth, hand-towel or pillowcase may be used during prolonged treatment applications.  The Gel-Pouch and Wrap are both Latex-Free and the Gel Bag ingredients are non toxic.    [facility] Wrap care instructions  The [facility] Cold Therapy Wrap may be hand washed and hung to dry when needed.    [facility] re-order information  Additional [facility] body specific wraps and/or Gel Bags can be re-ordered from [email or web address] or call [facility] ([phone])

## 2023-09-13 VITALS
SYSTOLIC BLOOD PRESSURE: 117 MMHG | HEART RATE: 72 BPM | HEIGHT: 63 IN | BODY MASS INDEX: 29.41 KG/M2 | DIASTOLIC BLOOD PRESSURE: 73 MMHG | TEMPERATURE: 97.9 F | WEIGHT: 166 LBS | RESPIRATION RATE: 16 BRPM | OXYGEN SATURATION: 96 %

## 2023-09-13 PROCEDURE — 97165 OT EVAL LOW COMPLEX 30 MIN: CPT

## 2023-09-13 PROCEDURE — 25010000002 CEFAZOLIN IN DEXTROSE 2-4 GM/100ML-% SOLUTION: Performed by: ORTHOPAEDIC SURGERY

## 2023-09-13 PROCEDURE — 25010000002 HYDROMORPHONE PER 4 MG: Performed by: ORTHOPAEDIC SURGERY

## 2023-09-13 PROCEDURE — 97110 THERAPEUTIC EXERCISES: CPT

## 2023-09-13 PROCEDURE — 97535 SELF CARE MNGMENT TRAINING: CPT

## 2023-09-13 RX ADMIN — HYDROCODONE BITARTRATE AND ACETAMINOPHEN 1 TABLET: 7.5; 325 TABLET ORAL at 11:14

## 2023-09-13 RX ADMIN — BUSPIRONE HYDROCHLORIDE 15 MG: 10 TABLET ORAL at 08:29

## 2023-09-13 RX ADMIN — CEFAZOLIN SODIUM 2000 MG: 2 INJECTION, SOLUTION INTRAVENOUS at 00:32

## 2023-09-13 RX ADMIN — PANTOPRAZOLE SODIUM 40 MG: 40 TABLET, DELAYED RELEASE ORAL at 05:37

## 2023-09-13 RX ADMIN — DOCUSATE SODIUM 100 MG: 100 CAPSULE, LIQUID FILLED ORAL at 08:29

## 2023-09-13 RX ADMIN — ARIPIPRAZOLE 10 MG: 10 TABLET ORAL at 08:30

## 2023-09-13 RX ADMIN — HYDROCODONE BITARTRATE AND ACETAMINOPHEN 1 TABLET: 7.5; 325 TABLET ORAL at 05:37

## 2023-09-13 RX ADMIN — DULOXETINE HYDROCHLORIDE 60 MG: 60 CAPSULE, DELAYED RELEASE ORAL at 08:30

## 2023-09-13 RX ADMIN — HYDROMORPHONE HYDROCHLORIDE 0.5 MG: 1 INJECTION, SOLUTION INTRAMUSCULAR; INTRAVENOUS; SUBCUTANEOUS at 02:17

## 2023-09-13 RX ADMIN — GABAPENTIN 800 MG: 400 CAPSULE ORAL at 05:37

## 2023-09-13 RX ADMIN — ATENOLOL 50 MG: 50 TABLET ORAL at 08:29

## 2023-09-13 NOTE — PLAN OF CARE
Goal Outcome Evaluation:  Plan of Care Reviewed With: patient           Outcome Evaluation: Pt s/p R humerus ORIF, presents w/ increased pain, RUE edema, generalized weakness, and mild balance deficits limiting her ADL independence. Pt mod I w/ bed mobility, SBA for transfers and functional mobility in hallway, CGA for navigating stairs. Pt educated on R shoulder ROM & NWB precautions, educated on jeffrey-dressing technique, management of interscalene nerve catheter to avoid dislodgement w/ ADLs, RUE HEP, donning/doffing sling, and proper technique for R axilla care, pt verbalized understanding. Pt would benefit from continued skilled IPOT services to address current functional deficits. No IPPT services warranted at this time.      Anticipated Discharge Disposition (OT): home with assist

## 2023-09-13 NOTE — PLAN OF CARE
Goal Outcome Evaluation:           Progress: improving  Outcome Evaluation: No acute events overnight. VSS on RA. Pain  managed w/prn norco x2, prn norco x1. No other prns administered. Tolerating diet, denies nausea. Adequate UOP. Ambulated in hallway. Stand-by assist. Calm, cooperative, pleasant. Will continue to monitor.

## 2023-09-13 NOTE — THERAPY EVALUATION
Patient Name: Eliza Capps  : 1975    MRN: [id number]                              Today's Date: 2023       Admit Date: 2023    Visit Dx:     ICD-10-CM ICD-9-CM   1. Closed 3-part fracture of proximal humerus, right, initial encounter  S42.291A 812.00     Patient Active Problem List   Diagnosis    Abnormal thyroid blood test    Chronic right-sided low back pain    Diabetes mellitus, type 2    Gastroesophageal reflux disease    Hx of atrial tachycardia    DARRYL (obstructive sleep apnea)    High blood pressure    Mass of right hip region    Closed 3-part fracture of proximal humerus, right, initial encounter     Past Medical History:   Diagnosis Date    Constipation     Diabetes mellitus     Diabetic neuropathy     Elevated cholesterol     Hyperlipidemia     Hypertension     Low back pain     Migraine     Sleep apnea     uses cpap     Past Surgical History:   Procedure Laterality Date    CHOLECYSTECTOMY      GASTRIC SLEEVE LAPAROSCOPIC  2021    LAMINECTOMY  10/2021    at  with Dr. Stubbs    LUMBAR DISCECTOMY  2008    L4-5 Discectomy -  Dr. Christopher Thacker    TUBAL ABDOMINAL LIGATION  2002      General Information       Row Name 23 0922          OT Time and Intention    Document Type evaluation  -     Mode of Treatment occupational therapy  -       Row Name 23 0922          General Information    Patient Profile Reviewed yes  -     Prior Level of Function independent:;bed mobility;transfer;all household mobility;community mobility;ADL's;shopping;driving;cooking;home management;cleaning  Pt reports one recent fall at [facility] w/ syncopal episode.  -     Existing Precautions/Restrictions fall;non-weight bearing;right  NWB RUE, R elbow/wrist/hand ROM ONLY; NO SHOULDER ROM, R interscalene nerve catheter, R donjoy ultra II sling  -     Barriers to Rehab medically complex  -       Row Name 23 0922          Living Environment    People in Home spouse  -        Row Name 09/13/23 0922          Home Main Entrance    Number of Stairs, Main Entrance two  -     Stair Railings, Main Entrance none  -       Row Name 09/13/23 0922          Stairs Within Home, Primary    Number of Stairs, Within Home, Primary none  -       Row Name 09/13/23 0922          Cognition    Orientation Status (Cognition) oriented x 4  -       Row Name 09/13/23 0922          Safety Issues, Functional Mobility    Impairments Affecting Function (Mobility) balance;endurance/activity tolerance;pain;strength  -     Comment, Safety Issues/Impairments (Mobility) RUE edema  -               User Key  (r) = Recorded By, (t) = Taken By, (c) = Cosigned By      Initials Name Provider Type     Eliza Merino, OT Occupational Therapist                     Mobility/ADL's       Row Name 09/13/23 0924          Bed Mobility    Bed Mobility supine-sit  -     Supine-Sit [place] (Bed Mobility) modified independence  -     Assistive Device (Bed Mobility) bed rails;head of bed elevated  -       Row Name 09/13/23 0924          Transfers    Transfers sit-stand transfer;stand-sit transfer  -     Comment, (Transfers) Pt performed STS & functional mobility in [facility], BP checked 124/76, pt then performed STSx5 & BP rechecked 112/68, RN notified.  -       Row Name 09/13/23 0924          Sit-Stand Transfer    Sit-Stand [place] (Transfers) standby assist  -       Row Name 09/13/23 0924          Stand-Sit Transfer    Stand-Sit [place] (Transfers) standby assist  -       Row Name 09/13/23 0924          Functional Mobility    Functional Mobility- Ind. Level standby assist  -     Functional Mobility-Distance (Feet) --  300  -     Functional Mobility- Comment O2 sats maintained >95% on RA w/ functional mobility in hallway. Pt also demo'd ability to navigate 2 steps w/ CGA and no LOB noted.  -     Patient was able to Ambulate yes  -       Row Name 09/13/23 0924          Activities of  Daily Living    BADL Assessment/Intervention upper body dressing;feeding;bathing  -       Row Name 09/13/23 0924          Mobility    Extremity Weight-bearing Status right upper extremity   -     Right Upper Extremity (Weight-bearing Status) non weight-bearing (NWB)   -       Row Name 09/13/23 0924          Upper Body Dressing Assessment/Training    [place] Level (Upper Body Dressing) don;doff;other (see comments);maximum assist (25% patient effort);verbal cues  RUE sling  -     Position (Upper Body Dressing) edge of bed sitting  -     Comment, (Upper Body Dressing) Pt provided w/ donjoy ultra II sling for proper fit and comfort. Pt provided w/ handout and video link for intructions on donning/doffing sling. Pt also educated on jeffrey-dressing technique and management of nerve catheter to avoid dislodgement w/ UBD, pt verbalized understanding.  -       Row Name 09/13/23 0924          Self-Feeding Assessment/Training    [place] Level (Feeding) prepare tray/open items;maximum assist (25% patient effort);scoop food and bring to mouth;liquids to mouth;set up  -     Position (Self-Feeding) supported sitting  -     Comment, (Feeding) Pt req max A to cut food into bite sized pieces, pt then able to use LUE to feed self.  -[facility] Name 09/13/23 0924          Bathing Assessment/Intervention    Comment, (Bathing) Pt educated on importance of keeping axilla area clean and dry, educated on proper technique for axilla care to maintain R shoulder ROM restrictions. Pt also educated on waiting to shower until nerve catheter is out, pt verbalized understanding.  -               User Key  (r) = Recorded By, (t) = Taken By, (c) = Cosigned By      Initials Name Provider Type     Eliza Merino OT Occupational Therapist                   Obj/Interventions       Row Name 09/13/23 0930          Sensory Assessment (Somatosensory)    Sensory Assessment (Somatosensory) UE sensation intact  -[facility]  Name 09/13/23 0930          Vision Assessment/Intervention    Visual Impairment/Limitations WFL  -       Row Name 09/13/23 0930          Range of Motion Comprehensive    General Range of Motion upper extremity range of motion deficits identified  -     Comment, General Range of Motion R shoulder deferred d/t NO SHOULDER ROM. Otherwise, LUE & R elbow/wrist/hand WFL  -       Row Name 09/13/23 0930          Strength Comprehensive (MMT)    General Manual Muscle Testing (MMT) Assessment upper extremity strength deficits identified  -     Comment, General Manual Muscle Testing (MMT) Assessment RUE deferred d/t NWB status, LUE grossly 4+/5  -       Row Name 09/13/23 0930          Elbow/Forearm (Therapeutic Exercise)    Elbow/Forearm (Therapeutic Exercise) AROM (active range of motion)  -     Elbow/Forearm AROM (Therapeutic Exercise) right;flexion;extension;supination;pronation;10 repetitions;sitting  -[facility] Name 09/13/23 0930          Wrist (Therapeutic Exercise)    Wrist (Therapeutic Exercise) AROM (active range of motion)  -     Wrist AROM (Therapeutic Exercise) right;flexion;extension;10 repetitions  -[facility] Name 09/13/23 0930          Hand (Therapeutic Exercise)    Hand (Therapeutic Exercise) AROM (active range of motion)  -     Hand AROM/AAROM (Therapeutic Exercise) right;AROM (active range of motion);finger flexion;finger extension;10 repetitions  -       Row Name 09/13/23 0930          Motor Skills    Therapeutic Exercise elbow/forearm;wrist;hand  -[facility] Name 09/13/23 0930          Balance    Balance Assessment sitting static balance;sitting dynamic balance;sit to stand dynamic balance;standing static balance;standing dynamic balance  -     Static Sitting Balance independent  -     Dynamic Sitting Balance standby assist  -     Position, Sitting Balance unsupported;sitting edge of bed;sitting in chair  -     Sit to Stand Dynamic Balance standby assist  -     Static  Standing Balance standby assist  -     Dynamic Standing Balance contact guard  -     Position/Device Used, Standing Balance unsupported  -     Balance Interventions sitting;sit to stand;occupation based/functional task  -               User Key  (r) = Recorded By, (t) = Taken By, (c) = Cosigned By      Initials Name Provider Type     Eliza Merino OT Occupational Therapist                   Goals/Plan       Row Name 09/13/23 0938          Dressing Goal 1 (OT)    Activity/Device (Dressing Goal 1, OT) upper body dressing  Pt & family will demo ability to perform UBD while maintaining R shoulder precautions and manage R interscalene nerve catheter to avoid dislodgement.  -     [place]/Cues Needed (Dressing Goal 1, OT) standby assist  -     Time Frame (Dressing Goal 1, OT) long term goal (LTG);10 days  -     Progress/Outcome (Dressing Goal 1, OT) goal ongoing  -       Row Name 09/13/23 0938          Toileting Goal 1 (OT)    Activity/Device (Toileting Goal 1, OT) adjust/manage clothing;perform perineal hygiene;commode;grab bar/safety frame  -     [place] Level/Cues Needed (Toileting Goal 1, OT) standby assist  -     Time Frame (Toileting Goal 1, OT) long term goal (LTG);10 days  -     Progress/Outcome (Toileting Goal 1, OT) goal ongoing  -       Row Name 09/13/23 0938          ROM Goal 1 (OT)    ROM Goal 1 (OT) Pt will demo ability to perform RUE daily to support ADL independence.  -     Time Frame (ROM Goal 1, OT) long term goal (LTG);10 days  -     Progress/Outcome (ROM Goal 1, OT) goal ongoing  -       Row Name 09/13/23 0938          Therapy Assessment/Plan (OT)    Planned Therapy Interventions (OT) activity tolerance training;BADL retraining;edema control/reduction;functional balance retraining;IADL retraining;occupation/activity based interventions;patient/caregiver education/training;ROM/therapeutic exercise;strengthening exercise;transfer/mobility retraining  -                User Key  (r) = Recorded By, (t) = Taken By, (c) = Cosigned By      Initials Name Provider Type     Eliza Merino, JONATAN Occupational Therapist                   Clinical Impression       [facility] Name 09/13/23 0932          Pain Assessment    Pretreatment Pain Rating 6/10  -     Posttreatment Pain Rating 6/10  -     Pain Location - Side/Orientation Right  -     Pain Location generalized  -     Pain Location - shoulder  -     Pre/Posttreatment Pain Comment Pt c/o pain in axilla area, pt educated that nerve catheter only blocks pain from the armpit up.  -     Pain Intervention(s) Repositioned;Ambulation/increased activity  -[facility] Name 09/13/23 0932          Plan of Care Review    Plan of Care Reviewed With patient  -     Outcome Evaluation Pt s/p R humerus ORIF, presents w/ increased pain, RUE edema, generalized weakness, and mild balance deficits limiting her ADL independence. Pt mod I w/ bed mobility, SBA for transfers and functional mobility in hallway, CGA for navigating stairs. Pt educated on R shoulder ROM & NWB precautions, educated on jeffrey-dressing technique, management of interscalene nerve catheter to avoid dislodgement w/ ADLs, RUE HEP, donning/doffing sling, and proper technique for R axilla care, pt verbalized understanding. Pt would benefit from continued skilled IPOT services to address current functional deficits. No IPPT services warranted at this time.  -[facility] Name 09/13/23 0932          Therapy Assessment/Plan (OT)    Rehab Potential (OT) good, to achieve stated therapy goals  -     Criteria for Skilled Therapeutic Interventions Met (OT) yes;skilled treatment is necessary  -     Therapy Frequency (OT) daily  -[facility] Name 09/13/23 0932          Therapy Plan Review/Discharge Plan (OT)    Anticipated Discharge Disposition (OT) home with assist  -MC       Row Name 09/13/23 0932          Vital Signs    Intra Systolic BP Rehab 124  -     Intra Treatment  Diastolic BP 76  -MC     Post Systolic BP Rehab 112  -MC     Post Treatment Diastolic BP 68  -MC     Pre SpO2 (%) 97  -MC     O2 Delivery Pre Treatment room air  -MC     Intra SpO2 (%) 95  -MC     O2 Delivery Intra Treatment room air  -MC     Post SpO2 (%) 96  -MC     O2 Delivery Post Treatment room air  -MC     Pre Patient Position Supine  -MC     Intra Patient Position Standing  -MC     Post Patient Position Sitting  -MC       Row Name 09/13/23 0932          Positioning and Restraints    Pre-Treatment Position in bed  -MC     Post Treatment Position chair  -MC     In Chair notified nsg;sitting;call light within reach;encouraged to call for assist  exit alarm status unchanged, RN deferred chair alarm  -MC               User Key  (r) = Recorded By, (t) = Taken By, (c) = Cosigned By      Initials Name Provider Type    Eliza Smiley, JONATAN Occupational Therapist                   Outcome Measures       Row Name 09/13/23 0940          How much help from another is currently needed...    Putting on and taking off regular lower body clothing? 2  -MC     Bathing (including washing, rinsing, and drying) 2  -MC     Toileting (which includes using toilet bed pan or urinal) 3  -MC     Putting on and taking off regular upper body clothing 2  -MC     Taking care of personal grooming (such as brushing teeth) 3  -MC     Eating meals 3  -MC     AM-PAC 6 Clicks Score (OT) 15  -MC       Row Name 09/13/23 0538 09/12/23 5673       How much help from another person do you currently need...    Turning from your back to your side while in flat bed without using bedrails? 4  -CD 4  -CD    Moving from lying on back to sitting on the side of a flat bed without bedrails? 4  -CD 4  -CD    Moving to and from a bed to a chair (including a wheelchair)? 4  -CD 4  -CD    Standing up from a chair using your arms (e.g., wheelchair, bedside chair)? 3  -CD 3  -CD    Climbing 3-5 steps with a railing? 4  -CD 4  -CD    To walk in hospital room? 4   - 4  -CD    AM-PAC 6 Clicks Score (PT) 23  -CD 23  -CD    Highest level of mobility 7 --> Walked 25 feet or more  -CD 7 --> Walked 25 feet or more  -CD      Row Name 09/13/23 0940          Functional Assessment    Outcome Measure Options AM-PAC 6 Clicks Daily Activity (OT)  -               User Key  (r) = Recorded By, (t) = Taken By, (c) = Cosigned By      Initials Name Provider Type     Eliza Merino OT Occupational Therapist    Evette Lopez RN Registered Nurse                    Occupational Therapy Education       Title: PT OT SLP Therapies (Done)       Topic: Occupational Therapy (Done)       Point: ADL training (Done)       Description:   Instruct learner(s) on proper safety adaptation and remediation techniques during self care or transfers.   Instruct in proper use of assistive devices.                  Learning Progress Summary             Patient Acceptance, E, VU by  at 9/13/2023 0940                         Point: Home exercise program (Done)       Description:   Instruct learner(s) on appropriate technique for monitoring, assisting and/or progressing therapeutic exercises/activities.                  Learning Progress Summary             Patient Acceptance, E, VU by  at 9/13/2023 0940                         Point: Precautions (Done)       Description:   Instruct learner(s) on prescribed precautions during self-care and functional transfers.                  Learning Progress Summary             Patient Acceptance, E, VU by  at 9/13/2023 0940                         Point: Body mechanics (Done)       Description:   Instruct learner(s) on proper positioning and spine alignment during self-care, functional mobility activities and/or exercises.                  Learning Progress Summary             Patient Acceptance, E, VU by  at 9/13/2023 0940                                         User Key       Initials Effective Dates Name Provider Type [facility] 10/14/22 -   Eliza Merino, OT Occupational Therapist OT                  OT Recommendation and Plan  Planned Therapy Interventions (OT): activity tolerance training, BADL retraining, edema control/reduction, functional balance retraining, IADL retraining, occupation/activity based interventions, patient/caregiver education/training, ROM/therapeutic exercise, strengthening exercise, transfer/mobility retraining  Therapy Frequency (OT): daily  Plan of Care Review  Plan of Care Reviewed With: patient  Outcome Evaluation: Pt s/p R humerus ORIF, presents w/ increased pain, RUE edema, generalized weakness, and mild balance deficits limiting her ADL independence. Pt mod I w/ bed mobility, SBA for transfers and functional mobility in hallway, CGA for navigating stairs. Pt educated on R shoulder ROM & NWB precautions, educated on jeffrey-dressing technique, management of interscalene nerve catheter to avoid dislodgement w/ ADLs, RUE HEP, donning/doffing sling, and proper technique for R axilla care, pt verbalized understanding. Pt would benefit from continued skilled IPOT services to address current functional deficits. No IPPT services warranted at this time.     Time Calculation:   Evaluation Complexity (OT)  Review Occupational Profile/Medical/Therapy History Complexity: brief/low complexity  Assessment, Occupational Performance/Identification of Deficit Complexity: 1-3 performance deficits  Clinical Decision Making Complexity (OT): problem focused assessment/low complexity  Overall Complexity of Evaluation (OT): low complexity     Time Calculation- OT       Row Name 09/13/23 0941             Time Calculation- OT    OT Start Time 0809  -         Timed Charges    13804 - OT Therapeutic Exercise Minutes 15  -MC      92973 - OT Therapeutic Activity Minutes 6  -MC      46688 - OT Self Care/Mgmt Minutes 28  -MC         Untimed Charges    OT Eval/Re-eval Minutes 31  -MC         Total Minutes    Timed Charges Total Minutes 49  -MC       Untimed Charges Total Minutes 31  -MC       Total Minutes 80  -MC                User Key  (r) = Recorded By, (t) = Taken By, (c) = Cosigned By      Initials Name Provider Type    Eliza Smiley OT Occupational Therapist                  Therapy Charges for Today       Code Description Service Date Service Provider Modifiers Qty    [id number]  OT THER PROC EA 15 MIN 9/13/2023 Eliza Merino OT GO 1    [id number]  OT SELF CARE/MGMT/TRAIN EA 15 MIN 9/13/2023 Eliza Merino OT GO 2    [id number]  OT EVAL LOW COMPLEXITY 3 9/13/2023 Eliza Merino OT GO 1                 Eliza Merino OT  9/13/2023

## 2023-09-13 NOTE — OP NOTE
HUMERUS PROXIMAL OPEN REDUCTION INTERNAL FIXATION  Procedure Report    Patient Name:  Eliza Capps  YOB: 1975    Date of Surgery:  9/12/2023     Indications:  47 year old female with right shoulder pain after fall. Radiographic and clinical findings were consistent with a right proximal humerus fracture with significant displacement. We discussed the risks and benefits of performing an open reduction, internal fixation of the right proximal humerus. The risks and benefits were discussed with the patient to include, but not limited to: bleeding, infection, nerve injury, failure of fixation, AVN, need for further procedures, loss of limb or life. The patient expressed understanding and wished to proceed.      Pre-op Diagnosis:        right proximal humerus fracture      Post-op Diagnosis:         right proximal humerus fracture  right biceps tendinitis    Procedure/CPT® Codes:  70863  11780    Procedure(s):  HUMERUS PROXIMAL OPEN REDUCTION INTERNAL FIXATION with biceps tenodesis    Staff:  Surgeon(s):  Christian Abarca Jr., MD    Circulator: Mary Dacosta RN; Marcelle Conde RN  Scrub Person: Rik Lozano; Chinyere Leblanc  Vendor Representative: Clayton Abbasi; Cuauhtemoc Reyna; Vipul Ackerman  Assistant: Tarsha Hyatt PA-C     Assistant: Tarsha Hyatt PA-C  was responsible for performing the following activities: Retraction, Suction, Irrigation, Suturing, Closing, and Placing Dressing and their skilled assistance was necessary for the success of this case.    Anesthesia: Choice    Estimated Blood Loss: 100ml    Implants:    Implant Name Type Inv. Item Serial No.  Lot No. LRB No. Used Action   SUT NONABS BONE DYNACORD UHMWPE W/OS/6 NDL 2PK STRIP/CELINA - [id number] Implant SUT NONABS BONE DYNACORD UHMWPE W/OS/6 NDL 2PK STRIP/CELINA  DEPUY MITE[employer]uda[employer]cert [id number] Right 1 Implanted   SUT NONABS BONE DYNACORD UHMWPE W/OS/6 NDL 2PK STRIP/CELINA - [id number] Implant SUT NONABS  BONE DYNACORD UHMWPE W/OS/6 NDL 2PK STRIP/CELINA  DEPUY MITEK [id number] Right 1 Implanted   GRFT BONE GENEX SUBST 10CC - [id number] Implant GRFT BONE GENEX SUBST 10CC  NABIL US INC [id number] Right 1 Implanted   PLT HUM PROX HI RT 3HL 80MM - [id number] Implant PLT HUM PROX HI RT 3HL 80MM  [employer]  Right 1 Implanted   SCRW LP T15 NL 3.5X26MM - [id number] Implant SCRW LP T15 NL 3.5X26MM  [employer]  Right 1 Implanted   PEG ALPS LK 3.2X40MM - [id number] Implant PEG ALPS LK 3.2X40MM  [employer]  Right 1 Implanted   PEG ALPS LK 3.2X50MM - [id number] Implant PEG ALPS LK 3.2X50MM  [employer]  Right 1 Implanted   PEG ALPS LK 3.2X52MM - [id number] Implant PEG ALPS LK 3.2X52MM  [employer]  Right 1 Implanted   KWIRE SS 2.0MM NS - [id number] Implant KWIRE SS 2.0MM NS  [employer]  Right 1 Implanted   PEG ALPS LK 3.2X44MM - [id number] Implant PEG ALPS LK 3.2X44MM  [employer]  Right 1 Implanted   PEG ALPS LK 3.2X42MM - [id number] Implant PEG ALPS LK 3.2X42MM  [employer]  Right 1 Implanted   SCRW XIMENA LK 3.5X26MM - [id number] Implant SCRW XIMENA LK 3.5X26MM  [employer]  Right 1 Implanted   SCRW XIMENA LK 3.5X24MM - [id number] Implant SCRW XIMENA LK 3.5X24MM  [employer]  Right 1 Implanted       Specimen:                None      Findings: displaced, comminuted 3 part proximal humerus fracture     Complications: none apparent    Description of Procedure: On the day of surgery, we identified the right shoulder as the correct operative extremity. This was initialed by the surgeon with the patient's acknowledgment. The patient underwent placement of an interscalene block and was taken to the operating room and placed in the supine position. Upon induction of adequate anesthesia, the patient was brought up to the relaxed beach chair position (approximately 30 degrees) and the shoulder and upper extremity were prepped and draped in the usual sterile fashion. Timeout confirmed the correct patient and operative  extremity as well as that antibiotics were on board. A standard deltopectoral approach to the shoulder was carried out. It was carried sharply through the skin and subcutaneous tissue. Medial and lateral flaps were developed over the deltopectoral fascia. The cephalic vein was identified and mobilized laterally with the deltoid. The subdeltoid and subpectoral spaces were mobilized and a blunt retractor was placed deep to this. The leading edge of the pectoralis was released 1 cm exposing the long head of the biceps. This was tenosynovitic. It was tenodesed to the pectoralis and released proximal to this. The clavipectoral fascia was opened on the lateral edge of conjoined tendon and the retractor moved deep to this. All bursa was removed from the subdeltoid/subacromial spaces. The fracture was exposed and all fracture callous/hematoma was taken down with a rongeur and curette. #2 non absorbable suture was placed into the bone tendon junction of the tuberosities to mobilze the proximal fracture fragments. We then reduced the main fracture fragments to the shaft. K- wires were passed in a retrograde manner from the shaft and into the head, taking care to ensure anatomic reduction of the calcar. We then placed a proximal humeral locking plate to the lateral humerus, just lateral to the bicipital groove. A nonlocking  screw was then placed in a bicortical manner into the oblong hole of the proximal shaft aspect of the plate. We then tightened the screw in place, toggling the plate to ensure that the plate was low enough to not cause impingement prior to tightening/reducing the plate to the bone.  We then passed approximately 8 cc of genex into the large bone void within the proximal humerus to ensure more stability of the proximal humeral fixation. We then passed multiple locking pegs into the proximal aspect of the plate, ensuring adequate spread of screws, calcar fixation, and adequate length without penetrating the  articular surface. We then placed our additional non locking screws in a bicortical manner into the distal aspect of the plate. Our rotator cuff sutures were then passed through the peripheral holes of the plate and tensioned in a manner to allow back up fixation of our tuberosities. Final fluoroscopic views were taken, demonstrating anatomic reduction of the head/neck relationship, calcar, and tuberosities. We then irrigated the wound thoroughly.      The deltopectoral interval was approximated with 0 Vicryl, the subcutaneous tissue with 2-0 Vicryl, and the skin with an exofin dressing.  Anesthesia was reversed and the patient was taken to the recovery room in stable condition. All instrument, needle, and sponge counts were correct.      The plan for Mrs. Capps is to remain NWB RUE with ROM at elbow and wrist as tolerated. We will begin AAFE at follow up.    Christian Abarca Jr., MD     Date: 9/12/2023  Time: 20:35 EDT

## 2023-09-13 NOTE — PROGRESS NOTES
[facility]    Acute pain service Inpatient Progress Note    Patient Name: Eliza Capps  :  1975  MRN:  [id number]        Acute Pain  Service Inpatient Progress Note:    Analgesia:Good  Pain Score:5/10  LOC: alert and awake  Resp Status: room air  Cardiac: VS stable  Side Effects:None  Catheter Site:clean, dry and dressing intact  Cath type: peripheral nerve cath(InfuSystem)  Infusion rate: Ext/Pop: Basal: 1ml/hr, PIB: 5ml q 2 h, PCA: 5 ml q 30 min  Catheter Plan:Catheter to remain Insitu and Continue catheter infusion rate unchanged  Comments: Educated patient on PCA usage.

## 2023-09-13 NOTE — DISCHARGE SUMMARY
Date of Discharge:  9/13/2023    Discharge Diagnosis:   Right proximal humerus fracture    Problem List:    Closed 3-part fracture of proximal humerus, right, initial encounter      Presenting Problem/History of Present Illness  Closed 3-part fracture of proximal humerus, right, initial encounter [S47.305J]    Patient presents with displaced, comminuted 3 part proximal humerus fracture after syncopal episode.    Hospital Course  Patient is a 47 y.o. female presented with above findings. She underwent ORIF on 9/12/23. For further operative details, please refer to operative report. Post operatively, she was admitted for pain control and observation. On POD#1, her pain was well controlled and she was eating and drinking appropriately. We determined she was stable for discharge home.      Procedures Performed  Procedure(s):  HUMERUS PROXIMAL OPEN REDUCTION INTERNAL FIXATION       Consults:   Consults       No orders found for last 30 day(s).            Pertinent Test Results: radiology: fluoro demonstrating operative fixation of R proximal humerus fracture    Condition on Discharge:  stable    Vital Signs  Temp:  [96.7 °F (35.9 °C)-98.6 °F (37 °C)] 97.1 °F (36.2 °C)  Heart Rate:  [70-93] 70  Resp:  [11-18] 16  BP: (108-133)/(67-84) 123/79  FiO2 (%):  [60 %] 60 %    Discharge Disposition  Home or Self Care    Discharge Medications     Discharge Medications        New Medications        Instructions Start Date   docusate sodium 100 MG capsule  Commonly known as: COLACE   100 mg, Oral, 2 Times Daily      ondansetron 4 MG tablet  Commonly known as: Zofran   4 mg, Oral, Every 8 Hours PRN      oxyCODONE 5 MG immediate release tablet  Commonly known as: ROXICODONE   5 mg, Oral, Every 4 Hours PRN             Continue These Medications        Instructions Start Date   ARIPiprazole 10 MG tablet  Commonly known as: ABILIFY   10 mg, Oral, Daily      atenolol 50 MG tablet  Commonly known as: TENORMIN   50 mg, Oral, 2 Times  Daily      Auvelity  MG tablet controlled-release  Generic drug: Dextromethorphan-buPROPion ER   1 tablet, Oral, 2 Times Daily      B-12 IJ   Injection, Every 30 Days      busPIRone 15 MG tablet  Commonly known as: BUSPAR   15 mg, Oral, 2 Times Daily      DULoxetine 60 MG capsule  Commonly known as: CYMBALTA   60 mg, Oral, 2 Times Daily      esomeprazole 40 MG capsule  Commonly known as: nexIUM   40 mg, Oral, Every Morning Before Breakfast      fish oil 1000 MG capsule capsule   1,000 mg, Oral, Daily With Breakfast      gabapentin 800 MG tablet  Commonly known as: NEURONTIN   800 mg, Oral, 3 Times Daily      HYDROCODONE-ACETAMINOPHEN PO   7.5-325 mg, Oral, Every 4 Hours PRN      linaclotide 290 MCG capsule capsule  Commonly known as: LINZESS   290 mcg, Oral, Every Morning Before Breakfast      SUMAtriptan 100 MG tablet  Commonly known as: IMITREX   100 mg, Oral, As Needed, Take one tablet at onset of headache. May repeat dose one time in 2 hours if headache not relieved.       traZODone 50 MG tablet  Commonly known as: DESYREL   50 mg, Oral, Nightly PRN      Unable to find   1 each, 2 Times Daily, Vitamin-  bariatric infusion                Discharge Diet regular      Activity at Discharge  Activity Instructions       Non-Weight Bearing - Specify Restrictions      Patient May Shower; No Tub Baths, Pools or Hot Tubs              Follow-up Appointments  No future appointments.  Additional Instructions for the Follow-ups that You Need to Schedule       Apply Ice to Affected Shoulder Every 2 Hours   As directed      Discharge Follow-up with Specified Provider: Olena; 2 Weeks   As directed      To: Olena   Follow Up: 2 Weeks        Wear Sling At All Times (May Remove to Shower)   As directed              Test Results Pending at Discharge      Christian Abarca Jr., MD  09/13/23  07:00 EDT

## 2023-09-27 ENCOUNTER — TELEPHONE (OUTPATIENT)
Dept: NEUROLOGY | Facility: CLINIC | Age: 48
End: 2023-09-27
Payer: COMMERCIAL

## 2023-09-27 NOTE — TELEPHONE ENCOUNTER
BAYRONM TO SCHEDULE A NEW PATIENT EXAM WITH DR ANDRE. DR MARTINS HAD CONTACTED DR SHIRLEY TO GET HER IN AS SOON AS POSSIBLE.

## 2023-11-07 ENCOUNTER — OFFICE VISIT (OUTPATIENT)
Dept: NEUROLOGY | Facility: CLINIC | Age: 48
End: 2023-11-07
Payer: COMMERCIAL

## 2023-11-07 VITALS
HEIGHT: 63 IN | BODY MASS INDEX: 29.41 KG/M2 | DIASTOLIC BLOOD PRESSURE: 82 MMHG | WEIGHT: 166 LBS | OXYGEN SATURATION: 98 % | HEART RATE: 77 BPM | SYSTOLIC BLOOD PRESSURE: 116 MMHG

## 2023-11-07 DIAGNOSIS — R56.9 NEW ONSET SEIZURE: Primary | ICD-10-CM

## 2023-11-07 PROCEDURE — 3074F SYST BP LT 130 MM HG: CPT | Performed by: PSYCHIATRY & NEUROLOGY

## 2023-11-07 PROCEDURE — 1159F MED LIST DOCD IN RCRD: CPT | Performed by: PSYCHIATRY & NEUROLOGY

## 2023-11-07 PROCEDURE — 99204 OFFICE O/P NEW MOD 45 MIN: CPT | Performed by: PSYCHIATRY & NEUROLOGY

## 2023-11-07 PROCEDURE — 3079F DIAST BP 80-89 MM HG: CPT | Performed by: PSYCHIATRY & NEUROLOGY

## 2023-11-07 PROCEDURE — 1160F RVW MEDS BY RX/DR IN RCRD: CPT | Performed by: PSYCHIATRY & NEUROLOGY

## 2023-11-07 NOTE — PROGRESS NOTES
Subjective:    CC: Eliza Capps is seen today in consultation at the request of Dr. Iyer for Seizures       HPI:  47-year-old female with a past medical history of DARRYL on CPAP, chronic low back pain status post 2 lumbar surgeries, diabetes mellitus type 2 (last A1c of 5.8), diabetic neuropathy, hypertension, hyperlipidemia, anxiety, depression, gastric sleeve surgery, atrial tachycardia status post ablation presents with a new onset seizure.  As per patient she was at [facility] with her mother on 9/7 when she started to see wavy lines in her field of vision that progressively became worse.  She denies having a headache at the time.  Soon afterwards she started to grab at things in the air with her right hand followed by her eyes rolling back and falling down with questionable mild shaking.  Patient fell in such a way that she fractured her right humerus that has required surgery since then.  She states that she has almost full recollection of the episode including what her mother asked her during the spell.  Denies being sick at the time.  Is typically sleep deprived as she only gets 5 hours of sleep despite taking trazodone.  Also takes gabapentin 800 mg 3 times daily for her neuropathy.  After that she was taken to [facility] where she had a MRI brain that showed chronic ischemic changes but no acute intracranial abnormalities and a routine EEG that was unremarkable.  Her PCP also ordered her tilt table test that is still pending.  She denies having any family history of seizures, abnormal birth history, febrile seizures as a child or any severe concussions.   Of note-I reviewed her notes from it from at our    The following portions of the patient's history were reviewed today and updated as of 11/07/2023  : allergies, current medications, past family history, past medical history, past social history, past surgical history, and problem list  These document will be scanned to patient's  chart.      Current Outpatient Medications:     ARIPiprazole (ABILIFY) 10 MG tablet, Take 1 tablet by mouth Daily., Disp: , Rfl:     atenolol (TENORMIN) 50 MG tablet, Take 1 tablet by mouth 2 (Two) Times a Day., Disp: , Rfl:     busPIRone (BUSPAR) 15 MG tablet, Take 1 tablet by mouth 2 (Two) Times a Day., Disp: , Rfl:     Cyanocobalamin (B-12 IJ), Inject  as directed Every 30 (Thirty) Days., Disp: , Rfl:     Dextromethorphan-buPROPion ER (Auvelity)  MG tablet controlled-release, Take 1 tablet by mouth 2 (Two) Times a Day., Disp: , Rfl:     docusate sodium (COLACE) 100 MG capsule, Take 1 capsule by mouth 2 (Two) Times a Day., Disp: 20 capsule, Rfl: 0    DULoxetine (CYMBALTA) 60 MG capsule, Take 1 capsule by mouth 2 (Two) Times a Day., Disp: , Rfl:     esomeprazole (nexIUM) 40 MG capsule, Take 1 capsule by mouth Every Morning Before Breakfast., Disp: , Rfl:     gabapentin (NEURONTIN) 800 MG tablet, Take 1 tablet by mouth 3 (Three) Times a Day., Disp: , Rfl:     HYDROCODONE-ACETAMINOPHEN PO, Take 7.5-325 mg by mouth Every 4 (Four) Hours As Needed., Disp: , Rfl:     linaclotide (LINZESS) 290 MCG capsule capsule, Take 1 capsule by mouth Every Morning Before Breakfast., Disp: , Rfl:     Omega-3 Fatty Acids (fish oil) 1000 MG capsule capsule, Take 1 capsule by mouth Daily With Breakfast., Disp: , Rfl:     ondansetron (Zofran) 4 MG tablet, Take 1 tablet by mouth Every 8 (Eight) Hours As Needed for Nausea or Vomiting., Disp: 12 tablet, Rfl: 0    SUMAtriptan (IMITREX) 100 MG tablet, Take 1 tablet by mouth As Needed for Migraine. Take one tablet at onset of headache. May repeat dose one time in 2 hours if headache not relieved., Disp: , Rfl:     traZODone (DESYREL) 50 MG tablet, Take 1 tablet by mouth At Night As Needed for Sleep., Disp: , Rfl:     Unable to find, 1 each 2 (Two) Times a Day. Vitamin-  bariatric infusion, Disp: , Rfl:    Past Medical History:   Diagnosis Date    Constipation     Diabetes mellitus      "Diabetic neuropathy     Elevated cholesterol     Hyperlipidemia     Hypertension     Low back pain     Migraine     Sleep apnea     uses cpap      Past Surgical History:   Procedure Laterality Date    CHOLECYSTECTOMY      GASTRIC SLEEVE LAPAROSCOPIC  04/26/2021    LAMINECTOMY  10/2021    at  with Dr. Stubbs    LUMBAR DISCECTOMY  08/2008    L4-5 Discectomy -  Dr. Christopher Thacker    ORIF HUMERUS FRACTURE Right 9/12/2023    Procedure: HUMERUS PROXIMAL OPEN REDUCTION INTERNAL FIXATION RIGHT;  Surgeon: Christian Abarca Jr., MD;  [facility];  Service: Orthopedics;  Laterality: Right;    TUBAL ABDOMINAL LIGATION  11/2002      Family History   Problem Relation Age of Onset    Hypertension Mother     Heart disease Mother     Diabetes Father     Heart disease Father     Hypertension Father     Lung disease Father       Social History     Socioeconomic History    Marital status:    Tobacco Use    Smoking status: Never     Passive exposure: Never    Smokeless tobacco: Never   Vaping Use    Vaping Use: Never used   Substance and Sexual Activity    Alcohol use: Not Currently    Drug use: Never    Sexual activity: Defer     Review of Systems   Neurological:  Positive for seizures.   All other systems reviewed and are negative.    Objective:    /82   Pulse 77   Ht 160 cm (63\")   Wt 75.3 kg (166 lb)   SpO2 98%   BMI 29.41 kg/m²     Neurology Exam:    General apperance: NAD.     Mental status: Alert, awake and oriented to time place and person.    Recent and Remote memory: Intact.    Attention span and Concentration: Normal.     Language and Speech: Intact- No dysarthria.    Fluency, Naming , Repitition and Comprehension:  Intact    Cranial Nerves:   CN II: Visual fields are full. Intact. Fundi - Normal, No papillederma, Pupils - SONYA  CN III, IV and VI: Extraocular movements are intact. Normal saccades.   CN V: Facial sensation is intact.   CN VII: Muscles of facial expression reveal no asymmetry. " Intact.   CN VIII: Hearing is intact. Whispered voice intact.   CN IX and X: Palate elevates symmetrically. Intact  CN XI: Shoulder shrug is intact.   CN XII: Tongue is midline without evidence of atrophy or fasciculation.     Ophthalmoscopic exam of optic disc-normal    Motor:  Right UE muscle strength 3/5.  Restricted due to pain.  Normal tone.     Left UE muscle strength 5/5. Normal tone.      Right LE muscle strength5/5. Normal tone.     Left LE muscle strength 5/5. Normal tone.      Sensory: Normal light touch, vibration and pinprick sensation bilaterally.    DTRs: 2+ bilaterally in upper and lower extremities.    Babinski: Negative bilaterally.    Co-ordination: Normal finger-to-nose, heel to shin B/L.    Rhomberg: Negative.    Gait: Normal.    Cardiovascular: Regular rate and rhythm without murmur, gallop or rub.    Assessment and Plan:  1. New onset seizure  Based on her semiology patient could have had a partial seizure as she had automatisms with the right hand.  Both MRI brain and routine EEG were unremarkable  I will get a longer sleep deprived video EEG  May start her on medications after the EEG is completed  Counseled on seizure precautions including no driving for 3 months from her last episode  Of note-she should also speak to her PCP about increasing the dose of her trazodone as she still has difficulty sleeping on the low-dose    - EEG Continuous Monitoring With Video; Future       Return in about 3 months (around 2/7/2024).     I spent over 40 minutes with the patient face to face out of which over 50% (30 minutes) was spent in management, instructions and education.     Shameka Sabillon MD

## 2023-11-08 ENCOUNTER — TELEPHONE (OUTPATIENT)
Dept: NEUROLOGY | Facility: CLINIC | Age: 48
End: 2023-11-08
Payer: COMMERCIAL

## 2023-11-08 NOTE — TELEPHONE ENCOUNTER
Caller: JENNIFER    Relationship: SERENE FERNANDEZ    Best call back number: [phone]  [phone]    Who are you requesting to speak with (clinical staff, provider,  specific staff member): DR SHIRLEY    What was the call regarding:   OV NOTES FROM, 11-8-23  PLEASE FAX TO NUMBER LISTED ABOVE.

## 2023-11-09 ENCOUNTER — PATIENT ROUNDING (BHMG ONLY) (OUTPATIENT)
Dept: NEUROLOGY | Facility: CLINIC | Age: 48
End: 2023-11-09
Payer: COMMERCIAL

## 2023-11-09 NOTE — PROGRESS NOTES
November 9, 2023    Hello, may I speak with Eliza Judie Capps?    My name is weston    I am  with [facility]  [address] [facility] [address]  [facility] [address] [phone].    Before we get started may I verify your date of birth? 1975    I am calling to officially welcome you to our practice and ask about your recent visit. Is this a good time to talk?   Unable to reach pt.

## (undated) DEVICE — ANTIBACTERIAL UNDYED BRAIDED (POLYGLACTIN 910), SYNTHETIC ABSORBABLE SUTURE: Brand: COATED VICRYL

## (undated) DEVICE — GLV SURG PREMIERPRO MIC LTX PF SZ8 BRN

## (undated) DEVICE — GLV SURG PREMIERPRO GAMMEX NEOPRN PF SZ8 GRN

## (undated) DEVICE — Device

## (undated) DEVICE — COATED BRAIDED POLYESTER: Brand: TI-CRON

## (undated) DEVICE — KWIRE SS 2.0MM NS
Type: IMPLANTABLE DEVICE | Site: HUMERUS | Status: NON-FUNCTIONAL
Removed: 2023-09-12

## (undated) DEVICE — PAD ARMBRD SURG CONVOL 7.5X20X2IN

## (undated) DEVICE — BLANKT WARM LOWR/BDY 100X120CM

## (undated) DEVICE — PK MAJ SHLDR SPLT 10

## (undated) DEVICE — GOWN,REINFORCE,POLY,SIRUS,BREATH SLV,XLG: Brand: MEDLINE

## (undated) DEVICE — C-ARM: Brand: UNBRANDED

## (undated) DEVICE — INTENT TO BE USED WITH SUTURE MATERIAL FOR TISSUE CLOSURE: Brand: RICHARD-ALLAN® NEEDLE 1/2 CIRCLE TAPER

## (undated) DEVICE — SYS CLS SKIN PREMIERPRO EXOFINFUSION 22CM

## (undated) DEVICE — GLV SURG SENSICARE PI MIC PF SZ7 LF STRL

## (undated) DEVICE — ELECTRD BLD EZ CLN STD 4IN

## (undated) DEVICE — TRAP FLD MINIVAC MEGADYNE 100ML

## (undated) DEVICE — TBG PENCL TELESCP MEGADYNE SMOKE EVAC 10FT

## (undated) DEVICE — KT PUMP INFUBLOCK MDL 2100 PMKITSOLIS

## (undated) DEVICE — BNDG ELAS CO-FLEX SLF ADHR 4IN5YD LF STRL

## (undated) DEVICE — GLV SURG PREMIERPRO MIC LTX PF SZ7 BRN

## (undated) DEVICE — POSTN HD UNIV

## (undated) DEVICE — PATIENT RETURN ELECTRODE, SINGLE-USE, CONTACT QUALITY MONITORING, ADULT, WITH 9FT CORD, FOR PATIENTS WEIGING OVER 33LBS. (15KG): Brand: MEGADYNE